# Patient Record
Sex: FEMALE | Race: WHITE | NOT HISPANIC OR LATINO
[De-identification: names, ages, dates, MRNs, and addresses within clinical notes are randomized per-mention and may not be internally consistent; named-entity substitution may affect disease eponyms.]

---

## 2017-05-19 ENCOUNTER — APPOINTMENT (OUTPATIENT)
Dept: GASTROENTEROLOGY | Facility: CLINIC | Age: 62
End: 2017-05-19

## 2017-05-25 ENCOUNTER — RESULT REVIEW (OUTPATIENT)
Age: 62
End: 2017-05-25

## 2017-05-31 ENCOUNTER — RESULT REVIEW (OUTPATIENT)
Age: 62
End: 2017-05-31

## 2017-05-31 ENCOUNTER — APPOINTMENT (OUTPATIENT)
Dept: GASTROENTEROLOGY | Facility: CLINIC | Age: 62
End: 2017-05-31
Payer: COMMERCIAL

## 2017-05-31 PROCEDURE — 43239 EGD BIOPSY SINGLE/MULTIPLE: CPT | Mod: 59

## 2017-05-31 PROCEDURE — 45380 COLONOSCOPY AND BIOPSY: CPT

## 2017-06-06 ENCOUNTER — APPOINTMENT (OUTPATIENT)
Dept: ULTRASOUND IMAGING | Facility: IMAGING CENTER | Age: 62
End: 2017-06-06

## 2017-06-06 ENCOUNTER — OUTPATIENT (OUTPATIENT)
Dept: OUTPATIENT SERVICES | Facility: HOSPITAL | Age: 62
LOS: 1 days | End: 2017-06-06
Payer: COMMERCIAL

## 2017-06-06 DIAGNOSIS — Z00.8 ENCOUNTER FOR OTHER GENERAL EXAMINATION: ICD-10-CM

## 2017-06-06 PROCEDURE — 76700 US EXAM ABDOM COMPLETE: CPT

## 2017-06-08 ENCOUNTER — APPOINTMENT (OUTPATIENT)
Dept: GASTROENTEROLOGY | Facility: CLINIC | Age: 62
End: 2017-06-08

## 2017-06-09 ENCOUNTER — APPOINTMENT (OUTPATIENT)
Dept: OPHTHALMOLOGY | Facility: CLINIC | Age: 62
End: 2017-06-09

## 2017-07-11 ENCOUNTER — OUTPATIENT (OUTPATIENT)
Dept: OUTPATIENT SERVICES | Facility: HOSPITAL | Age: 62
LOS: 1 days | End: 2017-07-11
Payer: COMMERCIAL

## 2017-07-11 ENCOUNTER — TRANSCRIPTION ENCOUNTER (OUTPATIENT)
Age: 62
End: 2017-07-11

## 2017-07-11 VITALS
DIASTOLIC BLOOD PRESSURE: 61 MMHG | OXYGEN SATURATION: 98 % | SYSTOLIC BLOOD PRESSURE: 119 MMHG | RESPIRATION RATE: 14 BRPM | HEART RATE: 68 BPM

## 2017-07-11 VITALS
DIASTOLIC BLOOD PRESSURE: 70 MMHG | OXYGEN SATURATION: 96 % | HEIGHT: 63 IN | SYSTOLIC BLOOD PRESSURE: 119 MMHG | TEMPERATURE: 99 F | WEIGHT: 190.04 LBS | RESPIRATION RATE: 12 BRPM | HEART RATE: 67 BPM

## 2017-07-11 DIAGNOSIS — H35.372 PUCKERING OF MACULA, LEFT EYE: ICD-10-CM

## 2017-07-11 PROCEDURE — 67042 VIT FOR MACULAR HOLE: CPT | Mod: LT

## 2017-07-11 NOTE — ASU PATIENT PROFILE, ADULT - VISION (WITH CORRECTIVE LENSES IF THE PATIENT USUALLY WEARS THEM):
Partially impaired: cannot see medication labels or newsprint, but can see obstacles in path, and the surrounding layout; can count fingers at arm's length/left eye

## 2017-07-11 NOTE — ASU DISCHARGE PLAN (ADULT/PEDIATRIC). - NOTIFY
Persistent Nausea and Vomiting/Pain not relieved by Medications/Swelling that continues/Bleeding that does not stop/Fever greater than 101

## 2017-07-11 NOTE — ASU PATIENT PROFILE, ADULT - PMH
Postmenopausal bleeding    Insomnia    MVP (mitral valve prolapse)    Squamous cell carcinoma    Basal cell carcinoma    Fibrocystic breast disease    Hypothyroid

## 2017-09-08 ENCOUNTER — APPOINTMENT (OUTPATIENT)
Dept: MAMMOGRAPHY | Facility: IMAGING CENTER | Age: 62
End: 2017-09-08
Payer: COMMERCIAL

## 2017-09-08 ENCOUNTER — OUTPATIENT (OUTPATIENT)
Dept: OUTPATIENT SERVICES | Facility: HOSPITAL | Age: 62
LOS: 1 days | End: 2017-09-08
Payer: COMMERCIAL

## 2017-09-08 ENCOUNTER — APPOINTMENT (OUTPATIENT)
Dept: ULTRASOUND IMAGING | Facility: IMAGING CENTER | Age: 62
End: 2017-09-08
Payer: COMMERCIAL

## 2017-09-08 DIAGNOSIS — Z00.00 ENCOUNTER FOR GENERAL ADULT MEDICAL EXAMINATION WITHOUT ABNORMAL FINDINGS: ICD-10-CM

## 2017-09-08 PROCEDURE — 77063 BREAST TOMOSYNTHESIS BI: CPT | Mod: 26

## 2017-09-08 PROCEDURE — 76641 ULTRASOUND BREAST COMPLETE: CPT

## 2017-09-08 PROCEDURE — 77063 BREAST TOMOSYNTHESIS BI: CPT

## 2017-09-08 PROCEDURE — 77067 SCR MAMMO BI INCL CAD: CPT

## 2017-09-08 PROCEDURE — G0202: CPT | Mod: 26

## 2017-09-08 PROCEDURE — 76641 ULTRASOUND BREAST COMPLETE: CPT | Mod: 26,50

## 2017-12-14 ENCOUNTER — APPOINTMENT (OUTPATIENT)
Dept: OPHTHALMOLOGY | Facility: CLINIC | Age: 62
End: 2017-12-14
Payer: COMMERCIAL

## 2017-12-14 PROCEDURE — 92012 INTRM OPH EXAM EST PATIENT: CPT

## 2018-05-31 ENCOUNTER — RESULT REVIEW (OUTPATIENT)
Age: 63
End: 2018-05-31

## 2018-09-21 ENCOUNTER — APPOINTMENT (OUTPATIENT)
Dept: RADIOLOGY | Facility: IMAGING CENTER | Age: 63
End: 2018-09-21
Payer: COMMERCIAL

## 2018-09-21 ENCOUNTER — TRANSCRIPTION ENCOUNTER (OUTPATIENT)
Age: 63
End: 2018-09-21

## 2018-09-21 ENCOUNTER — OUTPATIENT (OUTPATIENT)
Dept: OUTPATIENT SERVICES | Facility: HOSPITAL | Age: 63
LOS: 1 days | End: 2018-09-21
Payer: COMMERCIAL

## 2018-09-21 ENCOUNTER — APPOINTMENT (OUTPATIENT)
Dept: MAMMOGRAPHY | Facility: IMAGING CENTER | Age: 63
End: 2018-09-21
Payer: COMMERCIAL

## 2018-09-21 ENCOUNTER — APPOINTMENT (OUTPATIENT)
Dept: ULTRASOUND IMAGING | Facility: IMAGING CENTER | Age: 63
End: 2018-09-21
Payer: COMMERCIAL

## 2018-09-21 DIAGNOSIS — Z00.8 ENCOUNTER FOR OTHER GENERAL EXAMINATION: ICD-10-CM

## 2018-09-21 PROCEDURE — 77067 SCR MAMMO BI INCL CAD: CPT | Mod: 26

## 2018-09-21 PROCEDURE — 77063 BREAST TOMOSYNTHESIS BI: CPT | Mod: 26

## 2018-09-21 PROCEDURE — 77080 DXA BONE DENSITY AXIAL: CPT | Mod: 26

## 2018-09-21 PROCEDURE — 76641 ULTRASOUND BREAST COMPLETE: CPT

## 2018-09-21 PROCEDURE — 76641 ULTRASOUND BREAST COMPLETE: CPT | Mod: 26,50

## 2018-09-21 PROCEDURE — 77067 SCR MAMMO BI INCL CAD: CPT

## 2018-09-21 PROCEDURE — 77063 BREAST TOMOSYNTHESIS BI: CPT

## 2018-09-21 PROCEDURE — 77080 DXA BONE DENSITY AXIAL: CPT

## 2019-09-01 ENCOUNTER — APPOINTMENT (OUTPATIENT)
Dept: MRI IMAGING | Facility: IMAGING CENTER | Age: 64
End: 2019-09-01
Payer: COMMERCIAL

## 2019-09-01 ENCOUNTER — OUTPATIENT (OUTPATIENT)
Dept: OUTPATIENT SERVICES | Facility: HOSPITAL | Age: 64
LOS: 1 days | End: 2019-09-01
Payer: COMMERCIAL

## 2019-09-01 DIAGNOSIS — Z00.8 ENCOUNTER FOR OTHER GENERAL EXAMINATION: ICD-10-CM

## 2019-09-01 PROCEDURE — 72148 MRI LUMBAR SPINE W/O DYE: CPT

## 2019-09-01 PROCEDURE — 72148 MRI LUMBAR SPINE W/O DYE: CPT | Mod: 26

## 2019-10-17 ENCOUNTER — APPOINTMENT (OUTPATIENT)
Dept: MAMMOGRAPHY | Facility: IMAGING CENTER | Age: 64
End: 2019-10-17
Payer: COMMERCIAL

## 2019-10-17 ENCOUNTER — APPOINTMENT (OUTPATIENT)
Dept: ULTRASOUND IMAGING | Facility: IMAGING CENTER | Age: 64
End: 2019-10-17
Payer: COMMERCIAL

## 2019-10-17 ENCOUNTER — OUTPATIENT (OUTPATIENT)
Dept: OUTPATIENT SERVICES | Facility: HOSPITAL | Age: 64
LOS: 1 days | End: 2019-10-17
Payer: COMMERCIAL

## 2019-10-17 DIAGNOSIS — R92.2 INCONCLUSIVE MAMMOGRAM: ICD-10-CM

## 2019-10-17 DIAGNOSIS — Z12.31 ENCOUNTER FOR SCREENING MAMMOGRAM FOR MALIGNANT NEOPLASM OF BREAST: ICD-10-CM

## 2019-10-17 PROCEDURE — 76641 ULTRASOUND BREAST COMPLETE: CPT

## 2019-10-17 PROCEDURE — 77063 BREAST TOMOSYNTHESIS BI: CPT

## 2019-10-17 PROCEDURE — 76641 ULTRASOUND BREAST COMPLETE: CPT | Mod: 26,50

## 2019-10-17 PROCEDURE — 77067 SCR MAMMO BI INCL CAD: CPT | Mod: 26

## 2019-10-17 PROCEDURE — 77067 SCR MAMMO BI INCL CAD: CPT

## 2019-10-17 PROCEDURE — 77063 BREAST TOMOSYNTHESIS BI: CPT | Mod: 26

## 2020-01-22 ENCOUNTER — TRANSCRIPTION ENCOUNTER (OUTPATIENT)
Age: 65
End: 2020-01-22

## 2020-03-04 ENCOUNTER — APPOINTMENT (OUTPATIENT)
Dept: OTOLARYNGOLOGY | Facility: CLINIC | Age: 65
End: 2020-03-04
Payer: COMMERCIAL

## 2020-03-04 VITALS
WEIGHT: 200 LBS | DIASTOLIC BLOOD PRESSURE: 79 MMHG | HEART RATE: 65 BPM | HEIGHT: 63 IN | SYSTOLIC BLOOD PRESSURE: 144 MMHG | BODY MASS INDEX: 35.44 KG/M2

## 2020-03-04 PROCEDURE — 99204 OFFICE O/P NEW MOD 45 MIN: CPT | Mod: 25

## 2020-03-04 PROCEDURE — 31231 NASAL ENDOSCOPY DX: CPT

## 2020-03-04 NOTE — PHYSICAL EXAM
[Midline] : trachea located in midline position [Normal] : no rashes [de-identified] : hypertrophic inferior turbs

## 2020-03-04 NOTE — PROCEDURE
[FreeTextEntry6] : Afrin and lidocaine were topically sprayed. Flexible scope #2 was used. Right nasal passage with normal hypertrophic inferior, normal middle and superior turbinates. Nasal passage patent with clear middle meatus and sphenoethmoid recess. Left nasal passage with normal hypertrophic inferior, normal middle and superior turbinates. Nasal passage was patent with clear middle meatus and sphenoethmoid recess. No mucopurulence or polyps appreciated. Nasopharynx clear.

## 2020-03-04 NOTE — HISTORY OF PRESENT ILLNESS
[de-identified] : 63 y/o F with many years of nasal congestion at night that alternates sides.  No nasal congestion during the day.  No sinus pain or pressure.  Does have daytime rhinitis, occurs with eating and randomly during the day.  No sinus infections, no snoring.  Has used nasal saline without improvement. Has not used other nasal sprays.

## 2020-03-04 NOTE — CONSULT LETTER
[Dear  ___] : Dear  [unfilled], [Consult Letter:] : I had the pleasure of evaluating your patient, [unfilled]. [Please see my note below.] : Please see my note below. [Consult Closing:] : Thank you very much for allowing me to participate in the care of this patient.  If you have any questions, please do not hesitate to contact me. [Sincerely,] : Sincerely, [FreeTextEntry3] : Manasa Lang MD\par Otolaryngology and Cranial Base Surgery\par Attending Physician - Department of Otolaryngology and Head & Neck Surgery\par [facility]\par  - [employer] [facility]\par Office: [phone]\par Fax: [phone]\par

## 2020-04-10 ENCOUNTER — TRANSCRIPTION ENCOUNTER (OUTPATIENT)
Age: 65
End: 2020-04-10

## 2020-04-25 ENCOUNTER — MESSAGE (OUTPATIENT)
Age: 65
End: 2020-04-25

## 2020-05-07 ENCOUNTER — APPOINTMENT (OUTPATIENT)
Dept: DISASTER EMERGENCY | Facility: CLINIC | Age: 65
End: 2020-05-07

## 2020-05-08 LAB
SARS-COV-2 IGG SERPL IA-ACNC: 6 INDEX
SARS-COV-2 IGG SERPL QL IA: POSITIVE

## 2020-08-19 ENCOUNTER — APPOINTMENT (OUTPATIENT)
Dept: PULMONOLOGY | Facility: CLINIC | Age: 65
End: 2020-08-19
Payer: COMMERCIAL

## 2020-08-19 VITALS
WEIGHT: 200 LBS | HEIGHT: 62.5 IN | BODY MASS INDEX: 35.88 KG/M2 | OXYGEN SATURATION: 93 % | DIASTOLIC BLOOD PRESSURE: 81 MMHG | HEART RATE: 79 BPM | SYSTOLIC BLOOD PRESSURE: 121 MMHG | RESPIRATION RATE: 12 BRPM

## 2020-08-19 DIAGNOSIS — Z80.1 FAMILY HISTORY OF MALIGNANT NEOPLASM OF TRACHEA, BRONCHUS AND LUNG: ICD-10-CM

## 2020-08-19 DIAGNOSIS — Z86.39 PERSONAL HISTORY OF OTHER ENDOCRINE, NUTRITIONAL AND METABOLIC DISEASE: ICD-10-CM

## 2020-08-19 LAB
BASOPHILS # BLD AUTO: 0.02 K/UL
BASOPHILS NFR BLD AUTO: 0.3 %
DEPRECATED D DIMER PPP IA-ACNC: <150 NG/ML DDU
EOSINOPHIL # BLD AUTO: 0.28 K/UL
EOSINOPHIL NFR BLD AUTO: 4.6 %
HCT VFR BLD CALC: 41.5 %
HGB BLD-MCNC: 13 G/DL
IMM GRANULOCYTES NFR BLD AUTO: 0.3 %
LYMPHOCYTES # BLD AUTO: 0.99 K/UL
LYMPHOCYTES NFR BLD AUTO: 16.2 %
MAN DIFF?: NORMAL
MCHC RBC-ENTMCNC: 30 PG
MCHC RBC-ENTMCNC: 31.3 GM/DL
MCV RBC AUTO: 95.6 FL
MONOCYTES # BLD AUTO: 0.49 K/UL
MONOCYTES NFR BLD AUTO: 8 %
NEUTROPHILS # BLD AUTO: 4.33 K/UL
NEUTROPHILS NFR BLD AUTO: 70.6 %
NT-PROBNP SERPL-MCNC: 39 PG/ML
PLATELET # BLD AUTO: 252 K/UL
POCT - HEMOGLOBIN (HGB), QUANTITATIVE, TRANSCUTANEOUS: 13
RBC # BLD: 4.34 M/UL
RBC # FLD: 13.3 %
WBC # FLD AUTO: 6.13 K/UL

## 2020-08-19 PROCEDURE — 94727 GAS DIL/WSHOT DETER LNG VOL: CPT

## 2020-08-19 PROCEDURE — 99203 OFFICE O/P NEW LOW 30 MIN: CPT | Mod: 25

## 2020-08-19 PROCEDURE — 71046 X-RAY EXAM CHEST 2 VIEWS: CPT

## 2020-08-19 PROCEDURE — 94010 BREATHING CAPACITY TEST: CPT

## 2020-08-19 PROCEDURE — 99204 OFFICE O/P NEW MOD 45 MIN: CPT | Mod: 25

## 2020-08-19 PROCEDURE — 94729 DIFFUSING CAPACITY: CPT

## 2020-08-19 PROCEDURE — 88738 HGB QUANT TRANSCUTANEOUS: CPT

## 2020-08-19 PROCEDURE — 36415 COLL VENOUS BLD VENIPUNCTURE: CPT

## 2020-08-19 NOTE — ASSESSMENT
[FreeTextEntry1] : start GERD medicine\par trial of PPI\par high risk for JAIME based on HTN, BMI and M4\par obtain HST\par

## 2020-08-19 NOTE — HISTORY OF PRESENT ILLNESS
[Never] : never [TextBox_4] : LILIANE MILLER is a 65 year old female who presents with cough\par it started before covid 19. nonproductive cough. \par sometimes associated with food/drinks\par denies post nasal drip- had see ENT\par denies GERD- not on any PPI or h2 blocker- was on nexium in past- allergic to it\par cough noteable at night\par no wheezing. no PAYNE. no resp disterss\par \par She is covid antbiody +\par never smoker\par works as RN\par never seen pulm\par no pfts before\par \par \par Sleep:\par goes to bed around 1130pm. takes melatonin 10mg before bed- difficult SOL\par awakes overnight for random reason. long WASO\par final wake up is variable\par when not working she can sleep easily during the day\par drinks some coffee in mornign when working\par no evening caffeine\par no exercise in evening\par no tv in bed\par \par \par ESS7

## 2020-08-19 NOTE — PHYSICAL EXAM
[No Acute Distress] : no acute distress [No Deformities] : no deformities [Low Lying Soft Palate] : low lying soft palate [Enlarged Base of the Tongue] : enlarged base of the tongue [IV] : Mallampati Class: IV [No Murmurs] : no murmurs [Normal S1, S2] : normal s1, s2 [Normal Rate/Rhythm] : normal rate/rhythm [Clear to Auscultation Bilaterally] : clear to auscultation bilaterally [No Resp Distress] : no resp distress [No Focal Deficits] : no focal deficits [Oriented x3] : oriented x3 [Normal Affect] : normal affect

## 2020-08-19 NOTE — PROCEDURE
[FreeTextEntry1] : PA and lateral chest xray performed using standard projections. Bones and soft tissues structures are unremarkable.Cardiac silhouette appears normal. Lung fields are clear. No infiltrate or masses noted. Mediastinal contours are normal.\par IMPRESSION: no acute cardiopulmonary disease\par \par \par veronica within normal limits\par \par \par ESS 7

## 2020-08-20 LAB
ALBUMIN SERPL ELPH-MCNC: 4.5 G/DL
ALP BLD-CCNC: 168 U/L
ALT SERPL-CCNC: 36 U/L
ANION GAP SERPL CALC-SCNC: 15 MMOL/L
AST SERPL-CCNC: 29 U/L
BILIRUB SERPL-MCNC: 0.3 MG/DL
BUN SERPL-MCNC: 16 MG/DL
CALCIUM SERPL-MCNC: 9.7 MG/DL
CHLORIDE SERPL-SCNC: 104 MMOL/L
CO2 SERPL-SCNC: 24 MMOL/L
CREAT SERPL-MCNC: 0.72 MG/DL
CRP SERPL-MCNC: 0.62 MG/DL
FERRITIN SERPL-MCNC: 17 NG/ML
GLUCOSE SERPL-MCNC: 102 MG/DL
POTASSIUM SERPL-SCNC: 4.3 MMOL/L
PROCALCITONIN SERPL-MCNC: 0.06 NG/ML
PROT SERPL-MCNC: 6.7 G/DL
SODIUM SERPL-SCNC: 143 MMOL/L

## 2020-09-02 ENCOUNTER — APPOINTMENT (OUTPATIENT)
Dept: PULMONOLOGY | Facility: CLINIC | Age: 65
End: 2020-09-02
Payer: COMMERCIAL

## 2020-09-02 ENCOUNTER — FORM ENCOUNTER (OUTPATIENT)
Age: 65
End: 2020-09-02

## 2020-09-03 PROCEDURE — 95800 SLP STDY UNATTENDED: CPT

## 2020-09-21 ENCOUNTER — APPOINTMENT (OUTPATIENT)
Dept: PULMONOLOGY | Facility: CLINIC | Age: 65
End: 2020-09-21
Payer: COMMERCIAL

## 2020-09-21 VITALS
DIASTOLIC BLOOD PRESSURE: 88 MMHG | TEMPERATURE: 97.1 F | OXYGEN SATURATION: 98 % | SYSTOLIC BLOOD PRESSURE: 129 MMHG | RESPIRATION RATE: 12 BRPM | HEART RATE: 69 BPM

## 2020-09-21 PROCEDURE — 99213 OFFICE O/P EST LOW 20 MIN: CPT

## 2020-09-21 NOTE — PHYSICAL EXAM
[No Acute Distress] : no acute distress [No Deformities] : no deformities [Low Lying Soft Palate] : low lying soft palate [Enlarged Base of the Tongue] : enlarged base of the tongue [IV] : Mallampati Class: IV [Normal Rate/Rhythm] : normal rate/rhythm [Normal S1, S2] : normal s1, s2 [No Murmurs] : no murmurs [No Resp Distress] : no resp distress [Clear to Auscultation Bilaterally] : clear to auscultation bilaterally [No Focal Deficits] : no focal deficits [Oriented x3] : oriented x3 [Normal Affect] : normal affect

## 2020-09-21 NOTE — DISCUSSION/SUMMARY
[FreeTextEntry1] : severe JAIME\par start autocpap\par f/u on feno - cough variant asthma?\par contiue PPI\par denies post nasal drip\par \par \par Narcotic pain medication increases the severity of untreated JAIME and contributes to central sleep apnea. Sleeping pills may increase the severity of untreated JAIME and their use should be reevaluated once the JAIME is treated.  The risk of perioperative anesthesia complications may be increased in the presence of JAIME.\par \par \par

## 2020-09-21 NOTE — PROCEDURE
[FreeTextEntry1] : severe JAIME on 1 night test\par \par getting flu with her job\par \par cough- unable to do feno\par pfts done recently within normal flow and volumes\par \par \par RVP today

## 2020-09-21 NOTE — HISTORY OF PRESENT ILLNESS
[TextBox_4] : LILIANE MILLER is a 65 year old female who presents to f/u on hST\par \par 1) fatigue\par 1 night HST\par AHI 41/ hr\par min saturation 66%\par \par \par 2) cough\par etiology?\par occasional productive\par mostly at night\par no wheezing, no dyspnea

## 2020-09-22 LAB — RAPID RVP RESULT: NOT DETECTED

## 2020-10-13 ENCOUNTER — OUTPATIENT (OUTPATIENT)
Dept: OUTPATIENT SERVICES | Facility: HOSPITAL | Age: 65
LOS: 1 days | End: 2020-10-13
Payer: COMMERCIAL

## 2020-10-13 ENCOUNTER — RESULT REVIEW (OUTPATIENT)
Age: 65
End: 2020-10-13

## 2020-10-13 ENCOUNTER — APPOINTMENT (OUTPATIENT)
Dept: MRI IMAGING | Facility: IMAGING CENTER | Age: 65
End: 2020-10-13
Payer: COMMERCIAL

## 2020-10-13 DIAGNOSIS — Z00.8 ENCOUNTER FOR OTHER GENERAL EXAMINATION: ICD-10-CM

## 2020-10-13 PROCEDURE — 73721 MRI JNT OF LWR EXTRE W/O DYE: CPT | Mod: 26,LT

## 2020-10-13 PROCEDURE — 73721 MRI JNT OF LWR EXTRE W/O DYE: CPT

## 2020-11-06 ENCOUNTER — NON-APPOINTMENT (OUTPATIENT)
Age: 65
End: 2020-11-06

## 2020-11-09 ENCOUNTER — APPOINTMENT (OUTPATIENT)
Dept: PULMONOLOGY | Facility: CLINIC | Age: 65
End: 2020-11-09
Payer: COMMERCIAL

## 2020-11-09 VITALS
DIASTOLIC BLOOD PRESSURE: 74 MMHG | SYSTOLIC BLOOD PRESSURE: 112 MMHG | HEART RATE: 72 BPM | OXYGEN SATURATION: 95 % | RESPIRATION RATE: 14 BRPM | TEMPERATURE: 97.4 F

## 2020-11-09 PROCEDURE — 99072 ADDL SUPL MATRL&STAF TM PHE: CPT

## 2020-11-09 PROCEDURE — 99213 OFFICE O/P EST LOW 20 MIN: CPT

## 2020-11-09 NOTE — HISTORY OF PRESENT ILLNESS
[TextBox_4] : LILIANE MILLER is a 65 year old female who presents to f/u on JAIME/CPAP\par \par 1) JAIME\par AHI 41/hr\par started autocpap\par still with cough- occurs mostly at night. dry in nature\par does not think she sleeps with mouth open\par \par 2) cough\par mostly at night\par she has been on claritin and nasal spray\par uses flonase qhs- no help\par off PPI\par \par has broken her foot- on ibuprofen or pain control\par

## 2020-11-09 NOTE — ASSESSMENT
[FreeTextEntry1] : 1) JAIME\par Narcotic pain medication increases the severity of untreated JAIME and contributes to central sleep apnea. Sleeping pills may increase the severity of untreated JAIME and their use should be reevaluated once the JAIME is treated.  The risk of perioperative anesthesia complications may be increased in the presence of JAIME.\par \par \par she is using autocpap and benefitting fro use\par will meet with RT : CPAP clinic\par get chin strap\par increase max pressure to [id number]\par discussed humidification changes\par \par 2) cough- if the above does not help\par ent f/u? turbinates?\par she is on ibuprofen and may need to restart PPI\par pfts- done 8/2020- normal\par unable to do feno\par xray was normal as well\par if no resolution- ct chest noncontrast?\par \par f/u with me in 1 month\par

## 2020-11-09 NOTE — PROCEDURE
[FreeTextEntry1] : compliance data reviewed :\par \par 10/2020-11/5/2020\par % days used :  100 %\par avg use > 4 hours: 91%\par avg use: 7 hrs & 40  min\par avg pressure 13 cmh20\par avg AHI 8\par moderate leak.\par

## 2020-11-10 ENCOUNTER — APPOINTMENT (OUTPATIENT)
Dept: PULMONOLOGY | Facility: CLINIC | Age: 65
End: 2020-11-10
Payer: COMMERCIAL

## 2020-11-10 VITALS
DIASTOLIC BLOOD PRESSURE: 80 MMHG | BODY MASS INDEX: 35.88 KG/M2 | OXYGEN SATURATION: 94 % | WEIGHT: 200 LBS | TEMPERATURE: 98 F | SYSTOLIC BLOOD PRESSURE: 150 MMHG | RESPIRATION RATE: 16 BRPM | HEIGHT: 62.5 IN | HEART RATE: 86 BPM

## 2020-11-10 PROCEDURE — 94660 CPAP INITIATION&MGMT: CPT

## 2020-11-10 PROCEDURE — 99072 ADDL SUPL MATRL&STAF TM PHE: CPT

## 2020-11-11 NOTE — PROCEDURE
[FreeTextEntry1] : Pt seen at drs office. Vitals were taken and are stable. Pt stated that she needed a new mask. Currently was using a nasal pillow mask.  Pt was switched to a FFm a [employer] ffm sample was given. However if leaking should occur. I suggested she get a small headgear instead of the standard one that comes with the mask. Or to switch to something even more secure, an airfit f20 for her starter pk. She may need a xs or sml in this brand. Pt advised to call us  and let us know if we need to send a new rx to her DME company.

## 2020-11-23 ENCOUNTER — APPOINTMENT (OUTPATIENT)
Dept: OTOLARYNGOLOGY | Facility: CLINIC | Age: 65
End: 2020-11-23
Payer: COMMERCIAL

## 2020-11-23 VITALS
WEIGHT: 210 LBS | BODY MASS INDEX: 37.68 KG/M2 | DIASTOLIC BLOOD PRESSURE: 80 MMHG | HEIGHT: 62.5 IN | SYSTOLIC BLOOD PRESSURE: 140 MMHG | TEMPERATURE: 96.8 F

## 2020-11-23 PROCEDURE — 31575 DIAGNOSTIC LARYNGOSCOPY: CPT

## 2020-11-23 PROCEDURE — 99214 OFFICE O/P EST MOD 30 MIN: CPT | Mod: 25

## 2020-11-23 NOTE — ASSESSMENT
[FreeTextEntry1] : Cough\par - LPRD on exam.  Discussed often need to treat 4-6 weeks to allow decreased laryngeal inflammation\par - likely heightened by having had covid and hypersensitivity of larynx after URI can last for months\par - Lifestyle modification discussed - Reflux handout given and reviewed. \par \par Nasal congestion\par - Continue Flonase \par - Sinus wash\par \par Vasomotor Rhinitis\par - Ipratropium PRN\par \par HTN\par - F/U with PMD\par \par F/U PRN if symptoms not improved.

## 2020-11-23 NOTE — HISTORY OF PRESENT ILLNESS
[de-identified] : 66 y/o F with chronic cough for 1 year.  Pos throat clearing.  no heart burn.  No SOB.  No trouble eating or drinking.  Feels one a week to once a month may get "choking" sensation with liquids. \bijan Continues to use Flonase and feels nasal congestion has much improved.   \bijan Had taken Pantoprazole 40 mg x 3 weeks without improvement. Had COVID in April and recovered although her  passed from complications.\bijan

## 2020-11-23 NOTE — PROCEDURE
[FreeTextEntry6] : Flexible scope #G8 was used. Right nasal passage with normal inferior, middle and superior turbinates. Nasal passage patent with clear middle meatus and sphenoethmoid recess. Left nasal passage with normal inferior, middle and superior turbinates. Nasal passage was patent with clear middle meatus and sphenoethmoid recess. No mucopurulence or polyps appreciated. Nasopharynx clear.\par \par  [de-identified] : Flexible scope #G8 used. Passed through nasal passage and nasopharynx/oropharynx/hypopharynx clear. Supraglottis normal. Glottis with fully mobile vocal cords without lesions or masses. Visualized subglottis clear. Postcricoid area with erythema or edema. No pooling of secretions.\par \par

## 2020-11-25 ENCOUNTER — APPOINTMENT (OUTPATIENT)
Dept: PULMONOLOGY | Facility: CLINIC | Age: 65
End: 2020-11-25
Payer: COMMERCIAL

## 2020-11-25 PROCEDURE — 99213 OFFICE O/P EST LOW 20 MIN: CPT | Mod: 95

## 2020-11-25 NOTE — ASSESSMENT
[FreeTextEntry1] : needs FFM\par needs chin strap\par Narcotic pain medication increases the severity of untreated JAIME and contributes to central sleep apnea. Sleeping pills may increase the severity of untreated JAIME and their use should be reevaluated once the JAIME is treated.  The risk of perioperative anesthesia complications may be increased in the presence of JAIME.\par \par \par mai try to send her chin strap from office\par send rx again to [facility] [place] re: chin strap\par \par f.u telehealth 1 week

## 2020-11-25 NOTE — PROCEDURE
[FreeTextEntry1] : compliance data reviewed :\par \par 10/2020-11/18/2020\par % days used :  97 %\par avg use > 4 hours: 90%\par avg use: 7 hrs &34  min\par avg pressure 12 cmh20\par avg AHI 13\par moderate leak.\par

## 2020-11-25 NOTE — HISTORY OF PRESENT ILLNESS
[Never] : never [TextBox_4] : This visit was provided via telehealth using real-time 2-way audio visual technology. The patient,  LILIANE MILLER , was located at home, [address] [place] [address]\[place] [address] at the time of the visit. \par The provider, Doc Hsu, was located at office [address] [place] [address] at the time of the visit. \par The patient, Ms. LILIANE MILLER and Physician MD Hsu, Doc participated in the telehealth encounter. \par Verbal consent obtained by  from patient \par \par \par LILIANE MILLER is a 65 year old female who presents to f/u on CPAP\par she did see our RT- for CPAP I&M-  did not like the small mask- felt it was moving too much; too much pressure in the face.\par so she is back on the nasal pillows, still with dry mouth.\par humidification at 5. not helping\par not well rested- wakes up with dry mouth\par still with cough- saw ENT- back on PPI\par GI work up? she said she is pending EGD in the future\par \par \par \par DME: [email or web address]

## 2020-12-07 ENCOUNTER — APPOINTMENT (OUTPATIENT)
Dept: PULMONOLOGY | Facility: CLINIC | Age: 65
End: 2020-12-07
Payer: COMMERCIAL

## 2020-12-07 VITALS
TEMPERATURE: 97.6 F | SYSTOLIC BLOOD PRESSURE: 137 MMHG | OXYGEN SATURATION: 96 % | HEART RATE: 78 BPM | RESPIRATION RATE: 15 BRPM | DIASTOLIC BLOOD PRESSURE: 71 MMHG

## 2020-12-07 PROCEDURE — 99213 OFFICE O/P EST LOW 20 MIN: CPT

## 2020-12-07 PROCEDURE — 99072 ADDL SUPL MATRL&STAF TM PHE: CPT

## 2020-12-07 NOTE — HISTORY OF PRESENT ILLNESS
[Never] : never [TextBox_4] : LILIANE MILLER is a 65 year old female who presents to f/u on Anders\par did not like the nasal pillows\par back on her original mask- still with leak and dry mouth and poor quality sleep\par cough is better- she is on PPI- pending GI f/u in 2/2021\par \par

## 2020-12-07 NOTE — PROCEDURE
[FreeTextEntry1] : compliance data reviewed :\par \par 11/7/2020-12/6/2020\par % days used :  97 %\par avg use > 4 hours: 90 %\par avg use: 7 hrs  & 19  min\par avg pressure 13 cmh20\par avg AHI 11\par moderate leak.\par

## 2020-12-07 NOTE — DISCUSSION/SUMMARY
[FreeTextEntry1] : despite leak being better\par there is still high AHI\par think she needs titration study\par \par start at cpap and then transition to BPAP\par

## 2020-12-24 ENCOUNTER — APPOINTMENT (OUTPATIENT)
Dept: OPHTHALMOLOGY | Facility: CLINIC | Age: 65
End: 2020-12-24
Payer: COMMERCIAL

## 2020-12-24 ENCOUNTER — NON-APPOINTMENT (OUTPATIENT)
Age: 65
End: 2020-12-24

## 2020-12-24 PROCEDURE — 92004 COMPRE OPH EXAM NEW PT 1/>: CPT

## 2020-12-24 PROCEDURE — 99072 ADDL SUPL MATRL&STAF TM PHE: CPT

## 2020-12-28 ENCOUNTER — RESULT REVIEW (OUTPATIENT)
Age: 65
End: 2020-12-28

## 2020-12-28 ENCOUNTER — APPOINTMENT (OUTPATIENT)
Dept: MAMMOGRAPHY | Facility: IMAGING CENTER | Age: 65
End: 2020-12-28
Payer: COMMERCIAL

## 2020-12-28 ENCOUNTER — APPOINTMENT (OUTPATIENT)
Dept: RADIOLOGY | Facility: IMAGING CENTER | Age: 65
End: 2020-12-28
Payer: COMMERCIAL

## 2020-12-28 ENCOUNTER — OUTPATIENT (OUTPATIENT)
Dept: OUTPATIENT SERVICES | Facility: HOSPITAL | Age: 65
LOS: 1 days | End: 2020-12-28
Payer: COMMERCIAL

## 2020-12-28 DIAGNOSIS — Z00.8 ENCOUNTER FOR OTHER GENERAL EXAMINATION: ICD-10-CM

## 2020-12-28 PROCEDURE — 77067 SCR MAMMO BI INCL CAD: CPT | Mod: 26

## 2020-12-28 PROCEDURE — 77067 SCR MAMMO BI INCL CAD: CPT

## 2020-12-28 PROCEDURE — 77063 BREAST TOMOSYNTHESIS BI: CPT | Mod: 26

## 2020-12-28 PROCEDURE — 77080 DXA BONE DENSITY AXIAL: CPT | Mod: 26

## 2020-12-28 PROCEDURE — 77063 BREAST TOMOSYNTHESIS BI: CPT

## 2020-12-28 PROCEDURE — 77080 DXA BONE DENSITY AXIAL: CPT

## 2021-01-20 ENCOUNTER — TRANSCRIPTION ENCOUNTER (OUTPATIENT)
Age: 66
End: 2021-01-20

## 2021-01-21 ENCOUNTER — APPOINTMENT (OUTPATIENT)
Dept: PULMONOLOGY | Facility: CLINIC | Age: 66
End: 2021-01-21
Payer: COMMERCIAL

## 2021-01-21 PROCEDURE — 99213 OFFICE O/P EST LOW 20 MIN: CPT | Mod: 95

## 2021-01-21 NOTE — HISTORY OF PRESENT ILLNESS
[Never] : never [TextBox_4] : This visit was provided via telehealth using real-time 2-way audio visual technology. The patient,  LILIANE MILLER , was located at home, [address] [place] [address]\[place] [address] at the time of the visit. \par The provider, Doc Hsu, was located at office [address] [place] [address] at the time of the visit. \par The patient, Ms. LILIANE MILLER and Physician Doc Centeno participated in the telehealth encounter. \par Verbal consent obtained by  from patient \par \par \par LILIANE MILLER is a 65 year old female who presents to fu on JAIME\par she now has a chin strap- using nasal pillows\par some irritation with the nasal pillows- using vaseline\par still fatigue\par sleeping better through the night- no dry mouth\par

## 2021-01-21 NOTE — PROCEDURE
[FreeTextEntry1] : compliance data reviewed :\par \par 12/21/2020-1/19/2021\par avg use > 4 hours:  100%\par avg use: 7 hrs & 37  min\par avg pressure 14 cmh20\par avg AHI 4\par minimal leak.\par

## 2021-01-21 NOTE — PHYSICAL EXAM
[No Acute Distress] : no acute distress [Well Nourished] : well nourished [Well Developed] : well developed [No Resp Distress] : no resp distress [No Acc Muscle Use] : no acc muscle use [Oriented x3] : oriented x3

## 2021-01-21 NOTE — ASSESSMENT
[FreeTextEntry1] : continue autocpap with chin strap\par will change the max pressure to [id number]\par will reassess fatigue after 2-3 months of daily cpap use\par may need tos witch from nasal pillows to dreamwear or nasal mask if nare irritation persists\par \par she is adherent to cpap and should continue use\par \par Narcotic pain medication increases the severity of untreated JAIME and contributes to central sleep apnea. Sleeping pills may increase the severity of untreated JAIME and their use should be reevaluated once the JAIME is treated.  The risk of perioperative anesthesia complications may be increased in the presence of JAIME.\par \par \par

## 2021-02-23 ENCOUNTER — TRANSCRIPTION ENCOUNTER (OUTPATIENT)
Age: 66
End: 2021-02-23

## 2021-02-25 ENCOUNTER — APPOINTMENT (OUTPATIENT)
Dept: GASTROENTEROLOGY | Facility: CLINIC | Age: 66
End: 2021-02-25
Payer: COMMERCIAL

## 2021-02-25 ENCOUNTER — LABORATORY RESULT (OUTPATIENT)
Age: 66
End: 2021-02-25

## 2021-02-25 VITALS
DIASTOLIC BLOOD PRESSURE: 81 MMHG | HEIGHT: 62.5 IN | SYSTOLIC BLOOD PRESSURE: 128 MMHG | HEART RATE: 73 BPM | TEMPERATURE: 97.3 F | WEIGHT: 221 LBS | BODY MASS INDEX: 39.65 KG/M2

## 2021-02-25 DIAGNOSIS — Z63.4 DISAPPEARANCE AND DEATH OF FAMILY MEMBER: ICD-10-CM

## 2021-02-25 PROCEDURE — 99072 ADDL SUPL MATRL&STAF TM PHE: CPT

## 2021-02-25 PROCEDURE — 99204 OFFICE O/P NEW MOD 45 MIN: CPT | Mod: 25

## 2021-02-25 PROCEDURE — 82274 ASSAY TEST FOR BLOOD FECAL: CPT | Mod: QW

## 2021-02-25 PROCEDURE — 36415 COLL VENOUS BLD VENIPUNCTURE: CPT

## 2021-02-25 RX ORDER — MULTIVITAMIN,THER AND MINERALS
TABLET ORAL
Refills: 0 | Status: DISCONTINUED | COMMUNITY
End: 2021-02-25

## 2021-02-25 RX ORDER — PANTOPRAZOLE 40 MG/1
40 TABLET, DELAYED RELEASE ORAL DAILY
Qty: 30 | Refills: 0 | Status: DISCONTINUED | COMMUNITY
Start: 2020-08-19 | End: 2021-02-25

## 2021-02-25 RX ORDER — MELOXICAM 15 MG/1
TABLET ORAL
Refills: 0 | Status: DISCONTINUED | COMMUNITY
End: 2021-02-25

## 2021-02-25 RX ORDER — PANTOPRAZOLE 40 MG/1
40 TABLET, DELAYED RELEASE ORAL DAILY
Qty: 30 | Refills: 3 | Status: DISCONTINUED | COMMUNITY
Start: 2020-11-23 | End: 2021-02-25

## 2021-02-25 RX ORDER — GLUCOSAMINE/MSM/CHONDROIT SULF 500-166.6
1000 TABLET ORAL
Refills: 0 | Status: DISCONTINUED | COMMUNITY
End: 2021-02-25

## 2021-02-25 SDOH — SOCIAL STABILITY - SOCIAL INSECURITY: DISSAPEARANCE AND DEATH OF FAMILY MEMBER: Z63.4

## 2021-02-25 NOTE — CONSULT LETTER
[Dear  ___] : Dear  [unfilled], [Consult Letter:] : I had the pleasure of evaluating your patient, [unfilled]. [Please see my note below.] : Please see my note below. [Consult Closing:] : Thank you very much for allowing me to participate in the care of this patient.  If you have any questions, please do not hesitate to contact me. [Sincerely,] : Sincerely, [DrStorm  ___] : Dr. PADILLA [FreeTextEntry3] : Kip Snell M.D.\par

## 2021-02-25 NOTE — PHYSICAL EXAM
[General Appearance - Alert] : alert [General Appearance - In No Acute Distress] : in no acute distress [General Appearance - Well Nourished] : well nourished [General Appearance - Well Developed] : well developed [Sclera] : the sclera and conjunctiva were normal [Neck Appearance] : the appearance of the neck was normal [Neck Cervical Mass (___cm)] : no neck mass was observed [Jugular Venous Distention Increased] : there was no jugular-venous distention [Thyroid Diffuse Enlargement] : the thyroid was not enlarged [Thyroid Nodule] : there were no palpable thyroid nodules [Auscultation Breath Sounds / Voice Sounds] : lungs were clear to auscultation bilaterally [Heart Rate And Rhythm] : heart rate was normal and rhythm regular [Heart Sounds] : normal S1 and S2 [Heart Sounds Gallop] : no gallops [Murmurs] : no murmurs [Heart Sounds Pericardial Friction Rub] : no pericardial rub [Full Pulse] : the pedal pulses are present [Edema] : there was no peripheral edema [Bowel Sounds] : normal bowel sounds [Abdomen Soft] : soft [Abdomen Tenderness] : non-tender [Abdomen Mass (___ Cm)] : no abdominal mass palpated [Normal Sphincter Tone] : normal sphincter tone [No Rectal Mass] : no rectal mass [Internal Hemorrhoid] : internal hemorrhoids [External Hemorrhoid] : external hemorrhoids [Cervical Lymph Nodes Enlarged Posterior Bilaterally] : posterior cervical [Cervical Lymph Nodes Enlarged Anterior Bilaterally] : anterior cervical [Supraclavicular Lymph Nodes Enlarged Bilaterally] : supraclavicular [Inguinal Lymph Nodes Enlarged Bilaterally] : inguinal [Abnormal Walk] : normal gait [Nail Clubbing] : no clubbing  or cyanosis of the fingernails [Musculoskeletal - Swelling] : no joint swelling seen [Skin Color & Pigmentation] : normal skin color and pigmentation [Skin Turgor] : normal skin turgor [] : no rash [Oriented To Time, Place, And Person] : oriented to person, place, and time [Impaired Insight] : insight and judgment were intact [Affect] : the affect was normal [Occult Blood Positive] : stool was negative for occult blood [FreeTextEntry1] : FIT negative

## 2021-02-25 NOTE — ASSESSMENT
[FreeTextEntry1] : 1.  Chronic GERD with LPR; essentially negative EGD May 2017--she has multiple risk factors for reflux, and is aware that a negative EGD does not rule out GERD.\par 2.  Essentially negative colonoscopy May 2017; family history of colonic polyps (sister).  Stool guaiac negative with negative fecal immunochemical test on today's exam.\par 3.  Elevated alkaline phosphatase, GGT and ALT--statistically likely to have fatty liver.  Must rule out hepatic metastases, stones.  PBC, sarcoid would be other possibilities.\par 4.  Chronic cough may be related to undertreated reflux, etc.\par 5.  Obesity.\par 6.  Sleep apnea.\par 7.  Hypothyroidism.\par 8.  Hypertension.\par 9.  History of low back pain.\par 10.  Status post skin cancer excisions.\par 11.  Status post hysteroscopy with uterine polypectomy, T&A, bilateral iridectomies, LASIK.\par \par Plan:\par 1.  Medical records reviewed.\par 2.  Extensive bloodwork drawn by me this afternoon.\par 3.  Abdominal ultrasound.\par 4.  Can rechallenge with pantoprazole in the evenings, with Pepcid in the mornings to see if chronic cough, GERD improve.\par 5.  Might consider EGD, earlier repeat colonoscopy, depending on clinical course.\par 6.  Possible CT scan, liver biopsy, pending results of above.

## 2021-02-25 NOTE — HISTORY OF PRESENT ILLNESS
[FreeTextEntry1] : Karishma has been experiencing heartburn both during the day and frequently at night, requiring her to sleep in an upward position.  She has been taking Mylanta at bedtime, with incomplete relief.  She was taking pantoprazole for a couple of months, but stopped because of long-term side effect concerns.  She has had a chronic cough for many months, had COVID-19 in early spring (and her  passed from same).  From time to time, the cough becomes much worse, with associated choking, sometimes producing some spit or mucus.  She was seen by ENT, who advised her of LPR and suggested consideration of repeat EGD.  Last EGD 2017 was essentially negative.  Last colonoscopy May 2017  was negative for ileocolitis.  She has been averaging 2-3 BMs per day, often thinner, with some associated urgency, without gross blood.  Recent labs revealed increasing ALP (246), , with ALT 57.  Since last visit, she was diagnosed with sleep apnea.

## 2021-02-25 NOTE — REVIEW OF SYSTEMS
[Recent Weight Gain (___ Lbs)] : recent [unfilled] ~Ulb weight gain [Nasal Discharge] : nasal discharge [Shortness Of Breath] : shortness of breath [Cough] : cough [SOB on Exertion] : shortness of breath during exertion [Joint Pain] : joint pain [Joint Stiffness] : joint stiffness [Sleep Disturbances] : sleep disturbances [Negative] : Heme/Lymph [As Noted in HPI] : as noted in HPI [Heartburn] : heartburn

## 2021-02-26 LAB
AFP-TM SERPL-MCNC: 3.4 NG/ML
ALBUMIN SERPL ELPH-MCNC: 4.4 G/DL
ALP BLD-CCNC: 212 U/L
ALT SERPL-CCNC: 21 U/L
ANION GAP SERPL CALC-SCNC: 10 MMOL/L
APTT BLD: 34.4 SEC
AST SERPL-CCNC: 23 U/L
BASOPHILS # BLD AUTO: 0.03 K/UL
BASOPHILS NFR BLD AUTO: 0.4 %
BILIRUB DIRECT SERPL-MCNC: 0.1 MG/DL
BILIRUB SERPL-MCNC: 0.2 MG/DL
BUN SERPL-MCNC: 17 MG/DL
CALCIUM SERPL-MCNC: 9.8 MG/DL
CHLORIDE SERPL-SCNC: 103 MMOL/L
CHOLEST SERPL-MCNC: 190 MG/DL
CO2 SERPL-SCNC: 28 MMOL/L
CREAT SERPL-MCNC: 0.9 MG/DL
EOSINOPHIL # BLD AUTO: 0.28 K/UL
EOSINOPHIL NFR BLD AUTO: 3.6 %
ESTIMATED AVERAGE GLUCOSE: 117 MG/DL
FERRITIN SERPL-MCNC: 12 NG/ML
GGT SERPL-CCNC: 90 U/L
GLUCOSE SERPL-MCNC: 104 MG/DL
HBA1C MFR BLD HPLC: 5.7 %
HCT VFR BLD CALC: 39.7 %
HDLC SERPL-MCNC: 101 MG/DL
HGB BLD-MCNC: 12.3 G/DL
IMM GRANULOCYTES NFR BLD AUTO: 0.3 %
INR PPP: 1.01 RATIO
IRON SATN MFR SERPL: 11 %
IRON SERPL-MCNC: 42 UG/DL
LDLC SERPL CALC-MCNC: 66 MG/DL
LYMPHOCYTES # BLD AUTO: 1.19 K/UL
LYMPHOCYTES NFR BLD AUTO: 15.1 %
MAGNESIUM SERPL-MCNC: 2.2 MG/DL
MAN DIFF?: NORMAL
MCHC RBC-ENTMCNC: 29.8 PG
MCHC RBC-ENTMCNC: 31 GM/DL
MCV RBC AUTO: 96.1 FL
MONOCYTES # BLD AUTO: 0.61 K/UL
MONOCYTES NFR BLD AUTO: 7.8 %
NEUTROPHILS # BLD AUTO: 5.74 K/UL
NEUTROPHILS NFR BLD AUTO: 72.8 %
NONHDLC SERPL-MCNC: 89 MG/DL
PHOSPHATE SERPL-MCNC: 3.4 MG/DL
PLATELET # BLD AUTO: 304 K/UL
POTASSIUM SERPL-SCNC: 4.7 MMOL/L
PROT SERPL-MCNC: 7.3 G/DL
PT BLD: 12 SEC
RBC # BLD: 4.13 M/UL
RBC # FLD: 13.3 %
SODIUM SERPL-SCNC: 141 MMOL/L
T3 SERPL-MCNC: 93 NG/DL
T3RU NFR SERPL: 1.1 TBI
T4 FREE SERPL-MCNC: 1.2 NG/DL
T4 SERPL-MCNC: 7.8 UG/DL
TIBC SERPL-MCNC: 397 UG/DL
TRIGL SERPL-MCNC: 113 MG/DL
TSH SERPL-ACNC: 1.34 UIU/ML
UIBC SERPL-MCNC: 355 UG/DL
WBC # FLD AUTO: 7.87 K/UL

## 2021-02-28 ENCOUNTER — NON-APPOINTMENT (OUTPATIENT)
Age: 66
End: 2021-02-28

## 2021-03-01 LAB
ACE BLD-CCNC: 37 U/L
ANA PAT FLD IF-IMP: ABNORMAL
ANA SER IF-ACNC: ABNORMAL
ENDOMYSIUM IGA SER QL: NEGATIVE
ENDOMYSIUM IGA TITR SER: NORMAL
GLIADIN IGA SER QL: <5 UNITS
GLIADIN IGG SER QL: <5 UNITS
GLIADIN PEPTIDE IGA SER-ACNC: NEGATIVE
GLIADIN PEPTIDE IGG SER-ACNC: NEGATIVE
IGA SER QL IEP: 235 MG/DL
TTG IGA SER IA-ACNC: <1.2 U/ML
TTG IGA SER-ACNC: NEGATIVE
TTG IGG SER IA-ACNC: 3.4 U/ML
TTG IGG SER IA-ACNC: NEGATIVE

## 2021-03-02 LAB
MITOCHONDRIA AB SER IF-ACNC: NORMAL
SMOOTH MUSCLE AB SER QL IF: NORMAL

## 2021-03-11 ENCOUNTER — RESULT REVIEW (OUTPATIENT)
Age: 66
End: 2021-03-11

## 2021-03-11 ENCOUNTER — APPOINTMENT (OUTPATIENT)
Dept: ULTRASOUND IMAGING | Facility: IMAGING CENTER | Age: 66
End: 2021-03-11
Payer: COMMERCIAL

## 2021-03-11 ENCOUNTER — OUTPATIENT (OUTPATIENT)
Dept: OUTPATIENT SERVICES | Facility: HOSPITAL | Age: 66
LOS: 1 days | End: 2021-03-11
Payer: COMMERCIAL

## 2021-03-11 DIAGNOSIS — R09.81 NASAL CONGESTION: ICD-10-CM

## 2021-03-11 PROCEDURE — 76700 US EXAM ABDOM COMPLETE: CPT | Mod: 26

## 2021-03-11 PROCEDURE — 76700 US EXAM ABDOM COMPLETE: CPT

## 2021-03-25 ENCOUNTER — NON-APPOINTMENT (OUTPATIENT)
Age: 66
End: 2021-03-25

## 2021-03-25 ENCOUNTER — TRANSCRIPTION ENCOUNTER (OUTPATIENT)
Age: 66
End: 2021-03-25

## 2021-03-26 ENCOUNTER — TRANSCRIPTION ENCOUNTER (OUTPATIENT)
Age: 66
End: 2021-03-26

## 2021-05-18 ENCOUNTER — TRANSCRIPTION ENCOUNTER (OUTPATIENT)
Age: 66
End: 2021-05-18

## 2021-06-04 ENCOUNTER — APPOINTMENT (OUTPATIENT)
Dept: GASTROENTEROLOGY | Facility: CLINIC | Age: 66
End: 2021-06-04
Payer: MEDICARE

## 2021-06-04 VITALS
DIASTOLIC BLOOD PRESSURE: 82 MMHG | WEIGHT: 222 LBS | HEIGHT: 62.5 IN | BODY MASS INDEX: 39.83 KG/M2 | HEART RATE: 74 BPM | SYSTOLIC BLOOD PRESSURE: 128 MMHG

## 2021-06-04 PROCEDURE — 99214 OFFICE O/P EST MOD 30 MIN: CPT | Mod: 25

## 2021-06-04 NOTE — PHYSICAL EXAM
[General Appearance - Alert] : alert [General Appearance - In No Acute Distress] : in no acute distress [General Appearance - Well Nourished] : well nourished [General Appearance - Well Developed] : well developed [FreeTextEntry1] : obese [Oriented To Time, Place, And Person] : oriented to person, place, and time [Impaired Insight] : insight and judgment were intact [Affect] : the affect was normal

## 2021-06-04 NOTE — HISTORY OF PRESENT ILLNESS
[FreeTextEntry1] : Last labs revealed , GGT 90, with normal aminotransferases; MENA 1:80 (homogeneous) with negative smooth muscle and mitochondrial antibodies; and ferritin 12. She mentioned that she donates blood every 3 months.  She continues to experience some heartburn and frequent burping despite taking Pepcid 20 mg AM and pantoprazole 40 mg at bedtime.  Chronic nonproductive cough can be quiescent for 1-2 weeks, only to relapse, bhavesh. on recumbency, but sometimes occurs while sitting up during the day.  Bowels have changed somewhat, previously 2-3 BMs daily, now 1 BM daily.  Panendoscopy May 2017 revealed only hemorrhoids.  Abdominal ultrasound 3/11/2021 was normal.

## 2021-06-04 NOTE — ASSESSMENT
[FreeTextEntry1] : 1.  Chronic GERD with LPR; essentially negative EGD May 2017--she has multiple risk factors for reflux, and is aware that a negative EGD does not rule out GERD.\par 2.  Essentially negative colonoscopy May 2017; family history of colonic polyps (sister).  \par 3.  Elevated alkaline phosphatase, GGT of unclear cause--statistically likely to have fatty liver, despite negative ultrasound.  Elevated MENA with normal smooth muscle and mitochondrial antibodies would be of dubious significance (doubt autoimmune hepatitis). ?drug-induced liver injury. \par 4.  Low ferritin may be related to frequent blood donations, but must rule out concomitant GI source.  \par 5.  Chronic cough may be related to undertreated reflux, etc.\par 6.  Obesity.\par 7.  Sleep apnea.\par 8.  Hypothyroidism.\par 9.  Hypertension.\par 10.  History of low back pain.\par 11.  Status post skin cancer excisions.\par 12.  Status post hysteroscopy with uterine polypectomy, T&A, bilateral iridectomies, LASIK.\par 13.  Allergic to Nexium?\par \par Plan:\par 1.  Medical records reviewed.\par 2.  Bloodwork drawn by me this afternoon.\par 3.  CT enterography. \par 4.  Might consider EGD, earlier repeat colonoscopy, and even capsule endoscopy, depending on clinical course.\par 5.  May refer to Hepatology depending on results of above.  May ultimately require liver biopsy. \par 6.  Hold off on future blood donations!

## 2021-06-04 NOTE — CONSULT LETTER
[Dear  ___] : Dear  [unfilled], [Courtesy Letter:] : I had the pleasure of seeing your patient, [unfilled], in my office today. [Please see my note below.] : Please see my note below. [Consult Closing:] : Thank you very much for allowing me to participate in the care of this patient.  If you have any questions, please do not hesitate to contact me. [Sincerely,] : Sincerely, [FreeTextEntry3] : Kip Snell M.D.\par

## 2021-06-07 LAB
ACE BLD-CCNC: 41 U/L
ALBUMIN SERPL ELPH-MCNC: 4.3 G/DL
ALP BLD-CCNC: 176 U/L
ALT SERPL-CCNC: 16 U/L
ANION GAP SERPL CALC-SCNC: 11 MMOL/L
AST SERPL-CCNC: 19 U/L
BASOPHILS # BLD AUTO: 0.05 K/UL
BASOPHILS NFR BLD AUTO: 0.7 %
BILIRUB DIRECT SERPL-MCNC: 0.1 MG/DL
BILIRUB SERPL-MCNC: 0.2 MG/DL
BUN SERPL-MCNC: 16 MG/DL
CALCIUM SERPL-MCNC: 9.5 MG/DL
CHLORIDE SERPL-SCNC: 103 MMOL/L
CO2 SERPL-SCNC: 27 MMOL/L
CREAT SERPL-MCNC: 0.76 MG/DL
EOSINOPHIL # BLD AUTO: 0.19 K/UL
EOSINOPHIL NFR BLD AUTO: 2.5 %
FERRITIN SERPL-MCNC: 8 NG/ML
GGT SERPL-CCNC: 57 U/L
GLUCOSE SERPL-MCNC: 98 MG/DL
HCT VFR BLD CALC: 37.3 %
HGB BLD-MCNC: 11.3 G/DL
IMM GRANULOCYTES NFR BLD AUTO: 0.3 %
IRON SATN MFR SERPL: 7 %
IRON SERPL-MCNC: 31 UG/DL
LYMPHOCYTES # BLD AUTO: 1.36 K/UL
LYMPHOCYTES NFR BLD AUTO: 18 %
MAGNESIUM SERPL-MCNC: 2.2 MG/DL
MAN DIFF?: NORMAL
MCHC RBC-ENTMCNC: 28.2 PG
MCHC RBC-ENTMCNC: 30.3 GM/DL
MCV RBC AUTO: 93 FL
MONOCYTES # BLD AUTO: 0.54 K/UL
MONOCYTES NFR BLD AUTO: 7.1 %
NEUTROPHILS # BLD AUTO: 5.4 K/UL
NEUTROPHILS NFR BLD AUTO: 71.4 %
PHOSPHATE SERPL-MCNC: 2.9 MG/DL
PLATELET # BLD AUTO: 287 K/UL
POTASSIUM SERPL-SCNC: 4.8 MMOL/L
PROT SERPL-MCNC: 7 G/DL
RBC # BLD: 4.01 M/UL
RBC # FLD: 14.5 %
SODIUM SERPL-SCNC: 142 MMOL/L
TIBC SERPL-MCNC: 426 UG/DL
UIBC SERPL-MCNC: 395 UG/DL
WBC # FLD AUTO: 7.56 K/UL

## 2021-06-08 ENCOUNTER — OUTPATIENT (OUTPATIENT)
Dept: OUTPATIENT SERVICES | Facility: HOSPITAL | Age: 66
LOS: 1 days | End: 2021-06-08
Payer: COMMERCIAL

## 2021-06-08 ENCOUNTER — RESULT REVIEW (OUTPATIENT)
Age: 66
End: 2021-06-08

## 2021-06-08 ENCOUNTER — APPOINTMENT (OUTPATIENT)
Dept: CT IMAGING | Facility: IMAGING CENTER | Age: 66
End: 2021-06-08
Payer: COMMERCIAL

## 2021-06-08 DIAGNOSIS — Z00.8 ENCOUNTER FOR OTHER GENERAL EXAMINATION: ICD-10-CM

## 2021-06-08 PROCEDURE — 74177 CT ABD & PELVIS W/CONTRAST: CPT | Mod: 26

## 2021-06-08 PROCEDURE — 74177 CT ABD & PELVIS W/CONTRAST: CPT

## 2021-06-11 ENCOUNTER — NON-APPOINTMENT (OUTPATIENT)
Age: 66
End: 2021-06-11

## 2021-06-14 ENCOUNTER — NON-APPOINTMENT (OUTPATIENT)
Age: 66
End: 2021-06-14

## 2021-06-21 ENCOUNTER — APPOINTMENT (OUTPATIENT)
Dept: PULMONOLOGY | Facility: CLINIC | Age: 66
End: 2021-06-21
Payer: MEDICARE

## 2021-06-21 VITALS
SYSTOLIC BLOOD PRESSURE: 126 MMHG | DIASTOLIC BLOOD PRESSURE: 80 MMHG | OXYGEN SATURATION: 92 % | HEART RATE: 69 BPM | TEMPERATURE: 98.3 F

## 2021-06-21 PROCEDURE — 36415 COLL VENOUS BLD VENIPUNCTURE: CPT

## 2021-06-21 PROCEDURE — 99072 ADDL SUPL MATRL&STAF TM PHE: CPT

## 2021-06-21 PROCEDURE — 99213 OFFICE O/P EST LOW 20 MIN: CPT | Mod: 25

## 2021-06-21 NOTE — PROCEDURE
[FreeTextEntry1] : compliance data reviewed :\par \par 6/1/2021-6/17/2021\par % days used : 100 %\par avg use > 4 hours:  100%\par avg use: 7 hrs & 24  min\par avg pressure 14 cmh20\par avg AHI 5\par minimal leak.\par

## 2021-06-21 NOTE — HISTORY OF PRESENT ILLNESS
[TextBox_4] : LILIANE MILLER is a 66 year old female who presents to f/u on JAIME\par diagnosed with severe JAIME via HST\par has been on auto cpap for months\par still tired during the day\par thyroid functional normal in feb 2021\par seeing GI re: abnormal lab work\par on ppi and h2 blocker for her cough\par \par does not feel well rested on cpap\par sleepy / hard to get through her day\par no new medications\par

## 2021-06-21 NOTE — PHYSICAL EXAM
[No Acute Distress] : no acute distress [Well Nourished] : well nourished [Well Developed] : well developed [Normal Rate/Rhythm] : normal rate/rhythm [No Resp Distress] : no resp distress [No Acc Muscle Use] : no acc muscle use [Clear to Auscultation Bilaterally] : clear to auscultation bilaterally [Oriented x3] : oriented x3

## 2021-06-22 LAB
25(OH)D3 SERPL-MCNC: 37.3 NG/ML
VIT B12 SERPL-MCNC: 348 PG/ML

## 2021-08-26 ENCOUNTER — TRANSCRIPTION ENCOUNTER (OUTPATIENT)
Age: 66
End: 2021-08-26

## 2021-09-07 ENCOUNTER — TRANSCRIPTION ENCOUNTER (OUTPATIENT)
Age: 66
End: 2021-09-07

## 2021-09-14 ENCOUNTER — TRANSCRIPTION ENCOUNTER (OUTPATIENT)
Age: 66
End: 2021-09-14

## 2021-09-15 ENCOUNTER — TRANSCRIPTION ENCOUNTER (OUTPATIENT)
Age: 66
End: 2021-09-15

## 2021-12-15 ENCOUNTER — APPOINTMENT (OUTPATIENT)
Dept: PULMONOLOGY | Facility: CLINIC | Age: 66
End: 2021-12-15
Payer: MEDICARE

## 2021-12-15 VITALS
DIASTOLIC BLOOD PRESSURE: 83 MMHG | OXYGEN SATURATION: 93 % | HEART RATE: 76 BPM | SYSTOLIC BLOOD PRESSURE: 127 MMHG | TEMPERATURE: 98.4 F

## 2021-12-15 PROCEDURE — 71046 X-RAY EXAM CHEST 2 VIEWS: CPT

## 2021-12-15 PROCEDURE — 99214 OFFICE O/P EST MOD 30 MIN: CPT | Mod: 25

## 2021-12-15 NOTE — PROCEDURE
[FreeTextEntry1] : CXR: clear lungs, no focal consolidation or pleural effusions, cardiac silhouette appears normal.  No bony abnormality.\par \par \par Prior exam:\par HST reviewed see chart, severe JAIME

## 2021-12-15 NOTE — HISTORY OF PRESENT ILLNESS
[TextBox_4] : severe rika on auto cpap\par still tired\par insurance wouldn't approve in lab sleep study\par noted to have low O2, she says this is chronic\par \par also report recent cold and persistent cough/congestion\par no fever/chills

## 2021-12-15 NOTE — ASSESSMENT
[FreeTextEntry1] : given severe JAIME, low O2, would like to r/o hypoventilation, obtain ABG, may require bipap, will try for in lab cpap/bipap titration.\par \par \par medrol pack for bronchitis

## 2021-12-29 ENCOUNTER — APPOINTMENT (OUTPATIENT)
Dept: PULMONOLOGY | Facility: CLINIC | Age: 66
End: 2021-12-29
Payer: MEDICARE

## 2021-12-29 PROCEDURE — 82803 BLOOD GASES ANY COMBINATION: CPT

## 2021-12-29 PROCEDURE — 36600 WITHDRAWAL OF ARTERIAL BLOOD: CPT | Mod: 59

## 2022-01-11 ENCOUNTER — APPOINTMENT (OUTPATIENT)
Dept: INTERNAL MEDICINE | Facility: CLINIC | Age: 67
End: 2022-01-11
Payer: MEDICARE

## 2022-01-11 VITALS
TEMPERATURE: 98.7 F | HEART RATE: 73 BPM | OXYGEN SATURATION: 96 % | DIASTOLIC BLOOD PRESSURE: 84 MMHG | BODY MASS INDEX: 36.78 KG/M2 | WEIGHT: 205 LBS | SYSTOLIC BLOOD PRESSURE: 151 MMHG | HEIGHT: 62.5 IN

## 2022-01-11 DIAGNOSIS — H35.373 PUCKERING OF MACULA, BILATERAL: ICD-10-CM

## 2022-01-11 DIAGNOSIS — Z23 ENCOUNTER FOR IMMUNIZATION: ICD-10-CM

## 2022-01-11 DIAGNOSIS — Z87.898 PERSONAL HISTORY OF OTHER SPECIFIED CONDITIONS: ICD-10-CM

## 2022-01-11 DIAGNOSIS — Z86.39 PERSONAL HISTORY OF OTHER ENDOCRINE, NUTRITIONAL AND METABOLIC DISEASE: ICD-10-CM

## 2022-01-11 DIAGNOSIS — R09.02 HYPOXEMIA: ICD-10-CM

## 2022-01-11 PROCEDURE — 99203 OFFICE O/P NEW LOW 30 MIN: CPT

## 2022-01-11 RX ORDER — METHYLPREDNISOLONE 4 MG/1
4 TABLET ORAL
Qty: 1 | Refills: 0 | Status: DISCONTINUED | COMMUNITY
Start: 2021-12-15 | End: 2022-01-11

## 2022-01-11 NOTE — HEALTH RISK ASSESSMENT
[0] : 2) Feeling down, depressed, or hopeless: Not at all (0) [PHQ-2 Negative - No further assessment needed] : PHQ-2 Negative - No further assessment needed [[id number]] : 0

## 2022-01-11 NOTE — HISTORY OF PRESENT ILLNESS
[FreeTextEntry8] : 65 y/o female patient is new to the office presents today to establish care:\par - c/o persistent right ear pain, was seen at Urgent care on 12/27 given Augmentin x 10 day course, patient completed but states symptoms did not resolve, denies any bleeding, no discharge from ear \par - c/o chronic cough - needs refills of nasal sprays, states was never evaluated for asthma, unable to complete PFT, following with pulm

## 2022-01-11 NOTE — REVIEW OF SYSTEMS
[Fever] : no fever [Chills] : no chills [Fatigue] : no fatigue [Earache] : earache [Hearing Loss] : no hearing loss [Nosebleed] : no nosebleeds [Hoarseness] : no hoarseness [Nasal Discharge] : no nasal discharge [Sore Throat] : no sore throat [Postnasal Drip] : postnasal drip [Shortness Of Breath] : shortness of breath [Wheezing] : no wheezing [Cough] : cough [Dyspnea on Exertion] : no dyspnea on exertion [Negative] : Integumentary

## 2022-01-11 NOTE — PHYSICAL EXAM
[Normal Outer Ear/Nose] : the outer ears and nose were normal in appearance [Normal Oropharynx] : the oropharynx was normal [Normal TMs] : both tympanic membranes were normal [Normal] : no rash [de-identified] : +right auditory canal erythematous, inflamed, no discharge, no cerumen

## 2022-01-19 ENCOUNTER — TRANSCRIPTION ENCOUNTER (OUTPATIENT)
Age: 67
End: 2022-01-19

## 2022-02-09 ENCOUNTER — APPOINTMENT (OUTPATIENT)
Dept: OTOLARYNGOLOGY | Facility: CLINIC | Age: 67
End: 2022-02-09
Payer: MEDICARE

## 2022-02-09 VITALS
WEIGHT: 202 LBS | HEIGHT: 62 IN | BODY MASS INDEX: 37.17 KG/M2 | DIASTOLIC BLOOD PRESSURE: 85 MMHG | TEMPERATURE: 98 F | HEART RATE: 92 BPM | SYSTOLIC BLOOD PRESSURE: 131 MMHG

## 2022-02-09 DIAGNOSIS — H90.5 UNSPECIFIED SENSORINEURAL HEARING LOSS: ICD-10-CM

## 2022-02-09 PROCEDURE — 31231 NASAL ENDOSCOPY DX: CPT

## 2022-02-09 PROCEDURE — 99214 OFFICE O/P EST MOD 30 MIN: CPT | Mod: 25

## 2022-02-09 PROCEDURE — 92567 TYMPANOMETRY: CPT

## 2022-02-09 PROCEDURE — 92557 COMPREHENSIVE HEARING TEST: CPT

## 2022-02-09 NOTE — REVIEW OF SYSTEMS
[Ear Pain] : ear pain [Hearing Loss] : hearing loss [Ear Noises] : ear noises [Nasal Congestion] : nasal congestion [Negative] : Integumentary

## 2022-02-09 NOTE — END OF VISIT
[FreeTextEntry3] : I personally saw and examined Ms. LILIANE MILLER in detail this visit today. I personally reviewed the HPI, PMH, FH, SH, ROS, and medications and allergies. I have spoke to Pushpa Calvert PA-C regarding the history and have personally determined the assessment  and plan of care and documented this myself. i was present and participated in all key portions of the encounter and all procedures noted above. I have made changes in the body of the note where appropriate.\par

## 2022-02-09 NOTE — DATA REVIEWED
[No studies available for review at this time] : No studies available for review at this time [de-identified] : RT:mod to mod-severe mixed HL with shallow C/B tymp\par LT: mild HL 250Hz-500Hz rising to WNL thereafter

## 2022-02-09 NOTE — ASSESSMENT
[FreeTextEntry1] : right middle ear effusion\par - audiology evaluation shows right CHL and type c/b tymp\par - Continue Flonase BID and will add medrol dosepak to see if can get ear to drain\par - f/u 2 weeks and re-eval

## 2022-02-09 NOTE — PROCEDURE
[FreeTextEntry6] : Flexible scope #2 was used. Right nasal passage with normal inferior, middle and superior turbinates. Nasal passage patent with clear middle meatus and sphenoethmoid recess. Left nasal passage with normal inferior, middle and superior turbinates. Nasal passage was patent with clear middle meatus and sphenoethmoid recess. No mucopurulence or polyps appreciated. Nasopharynx clear with right ET orifice clear.

## 2022-02-09 NOTE — HISTORY OF PRESENT ILLNESS
[de-identified] : Patients right ear has been muffled since she was diagnosed with a right ear infection around [place].\par SHe was treated with Augmentin at the time, and continues to use Flonase as needed. \par SHe has a history of tinnitus at baseline, no worsening. No current dizziness or vertigo. She feels she needs to pop the right ear and cannot. She had an episode of bronchitis back in Dec 15th and was given a medrol pack for this however she did not have ear issue at that time. \par \par She has mild coughing and nasal congestion with another cold right now. \par \par \par

## 2022-02-09 NOTE — PHYSICAL EXAM
[Nasal Endoscopy Performed] : nasal endoscopy was performed, see procedure section for findings [Normal] : normal appearance, well groomed, well nourished, and in no acute distress [de-identified] : right blaise serous effusion superiorly noted behind the right TM

## 2022-02-23 ENCOUNTER — APPOINTMENT (OUTPATIENT)
Dept: OTOLARYNGOLOGY | Facility: CLINIC | Age: 67
End: 2022-02-23
Payer: MEDICARE

## 2022-02-23 VITALS
HEIGHT: 62 IN | BODY MASS INDEX: 36.8 KG/M2 | TEMPERATURE: 98.1 F | DIASTOLIC BLOOD PRESSURE: 68 MMHG | WEIGHT: 200 LBS | HEART RATE: 68 BPM | SYSTOLIC BLOOD PRESSURE: 116 MMHG

## 2022-02-23 PROCEDURE — 99214 OFFICE O/P EST MOD 30 MIN: CPT | Mod: 25

## 2022-02-23 PROCEDURE — 69420 INCISION OF EARDRUM: CPT | Mod: RT

## 2022-02-23 NOTE — HISTORY OF PRESENT ILLNESS
[de-identified] : 65 y/o F, with Muffled hearing in right ear since December 2021.  Had tinnitus before this sensation, that has been unchanged.  At last visit noted to have Effusion and CHL on right.  Is on Flonase and did Medrol Dose James without improvement.  Has also been trying to pop her ears without success.   No nasal congestion, however does get the sensation she needs to blow her nose.

## 2022-02-23 NOTE — PROCEDURE
[Same] : same as the Pre Op Dx. [] : M & T [FreeTextEntry3] : Procedure - Cerumen Removal. \par After informed verbal consent is obtained, cerumen is removed from the right ear canal with a curette and suction.  Normal appearing canal following removal.\par  [FreeTextEntry4] : phenol [FreeTextEntry6] : right serous blaise effusion

## 2022-02-23 NOTE — PHYSICAL EXAM
[Normal] : no abnormal secretions [de-identified] : Right with cerumen.  Left clear. [de-identified] : Right with blaise effusion.  Left clear.

## 2022-02-23 NOTE — ASSESSMENT
[FreeTextEntry1] : Right ANABEL and CHL; clear NP on endoscopy:\par - Cerumen removed today\par - Effusion persists despite PO and nasal steroids.  \par - Right tube placed in office today\par - F/U 2 weeks for tube check. \par \par I personally saw and examined Ms. LILIANE MILLER in detail this visit today. I personally reviewed the HPI, PMH, FH, SH, ROS and medications/allergies. I have spoken to LEIDY Butts regarding the history and have personally determined the assessment and plan of care, and documented this myself. I was present and participated in all key portions of the encounter and all procedures noted above. I have made changes in the body of the note where appropriate.\par \par Attesting Faculty: See Attending Signature Below

## 2022-03-30 ENCOUNTER — APPOINTMENT (OUTPATIENT)
Dept: OTOLARYNGOLOGY | Facility: CLINIC | Age: 67
End: 2022-03-30
Payer: MEDICARE

## 2022-03-30 VITALS
WEIGHT: 200 LBS | HEIGHT: 62 IN | HEART RATE: 75 BPM | BODY MASS INDEX: 36.8 KG/M2 | TEMPERATURE: 97.6 F | SYSTOLIC BLOOD PRESSURE: 129 MMHG | DIASTOLIC BLOOD PRESSURE: 82 MMHG

## 2022-03-30 PROCEDURE — 99213 OFFICE O/P EST LOW 20 MIN: CPT | Mod: 25

## 2022-03-30 PROCEDURE — 92557 COMPREHENSIVE HEARING TEST: CPT

## 2022-03-30 PROCEDURE — 92567 TYMPANOMETRY: CPT

## 2022-03-30 PROCEDURE — 69433 CREATE EARDRUM OPENING: CPT | Mod: RT

## 2022-03-30 NOTE — ASSESSMENT
[FreeTextEntry1] : Right ANABEL and CHL:\par - Tube out today on exam with recurrent Effusion. \par - Audiogram today with CHL\par - Right t-tube replaced today and effusion suctioned out\par - floxin gtt x 3 days\par - f/u 1 month for repeat audio\par \par \par \par I personally saw and examined Ms. LILIANE MILLER in detail this visit today. I personally reviewed the HPI, PMH, FH, SH, ROS and medications/allergies. I have spoken to LEIDY Butts regarding the history and have personally determined the assessment and plan of care, and documented this myself. I was present and participated in all key portions of the encounter and all procedures noted above. I have made changes in the body of the note where appropriate.\par \par Attesting Faculty: See Attending Signature Below

## 2022-03-30 NOTE — PHYSICAL EXAM
[Normal] : no abnormal secretions [de-identified] : Right tube out and TM intact with Effusion.  Left clear.

## 2022-03-30 NOTE — HISTORY OF PRESENT ILLNESS
[de-identified] : 65 y/o F with Right ETD and Effusion, s/p Tube placement 2/23/22.  No pain or d/c.  Notes hearing had improved a bit, then had a URI and hearing decreased.    Using Flonase and notes mild nasal congestion and d/c.

## 2022-03-30 NOTE — PROCEDURE
[Same] : same as the Pre Op Dx. [] : M & T [FreeTextEntry4] : phenol [FreeTextEntry6] : serous effusion suctioned. right plastic t-tube placed

## 2022-04-27 ENCOUNTER — APPOINTMENT (OUTPATIENT)
Dept: OTOLARYNGOLOGY | Facility: CLINIC | Age: 67
End: 2022-04-27
Payer: MEDICARE

## 2022-04-27 VITALS — SYSTOLIC BLOOD PRESSURE: 131 MMHG | HEART RATE: 71 BPM | TEMPERATURE: 98.3 F | DIASTOLIC BLOOD PRESSURE: 83 MMHG

## 2022-04-27 PROCEDURE — 99212 OFFICE O/P EST SF 10 MIN: CPT | Mod: 25

## 2022-04-27 PROCEDURE — G0268 REMOVAL OF IMPACTED WAX MD: CPT

## 2022-04-27 PROCEDURE — 92557 COMPREHENSIVE HEARING TEST: CPT

## 2022-04-27 PROCEDURE — 92567 TYMPANOMETRY: CPT

## 2022-04-27 NOTE — HISTORY OF PRESENT ILLNESS
[de-identified] : 65 y/o F with Right ETD and chronic effusion.  S/p Tube placement 2/23/22 and replaced at last visit 3/30/22.  Notes hearing is good.  No pain, no d/c.   using Flonase for mild congestion with good improvement.

## 2022-04-27 NOTE — ASSESSMENT
[FreeTextEntry1] : Right ETD with Tube in place. \par - Audiogram today with resolved CHL and patent tube on right\par - tube check every 3-4 months\par \par Cerumen:\par - Removed in office today\par \par \par I personally saw and examined Ms. LILIANE MILLER in detail this visit today. I personally reviewed the HPI, PMH, FH, SH, ROS and medications/allergies. I have spoken to LEIDY Butts regarding the history and have personally determined the assessment and plan of care, and documented this myself. I was present and participated in all key portions of the encounter and all procedures noted above. I have made changes in the body of the note where appropriate.\par \par Attesting Faculty: See Attending Signature Below

## 2022-04-27 NOTE — PHYSICAL EXAM
[Normal] : mucosa is normal [Midline] : trachea located in midline position [de-identified] : Rt. with cerumen.  Left clear.  [de-identified] : Right tube in place and patient.  Left WNL

## 2022-04-27 NOTE — PROCEDURE
[FreeTextEntry3] : Procedure - Cerumen Removal. \par After informed verbal consent is obtained, cerumen is removed from the right ear canal with an alligator forceps.  Normal appearing canal following removal.\par

## 2022-05-10 ENCOUNTER — APPOINTMENT (OUTPATIENT)
Dept: INTERNAL MEDICINE | Facility: CLINIC | Age: 67
End: 2022-05-10
Payer: MEDICARE

## 2022-05-10 VITALS
RESPIRATION RATE: 16 BRPM | HEIGHT: 62 IN | SYSTOLIC BLOOD PRESSURE: 129 MMHG | TEMPERATURE: 98 F | HEART RATE: 96 BPM | DIASTOLIC BLOOD PRESSURE: 80 MMHG | WEIGHT: 209 LBS | BODY MASS INDEX: 38.46 KG/M2 | OXYGEN SATURATION: 95 %

## 2022-05-10 DIAGNOSIS — H60.91 UNSPECIFIED OTITIS EXTERNA, RIGHT EAR: ICD-10-CM

## 2022-05-10 DIAGNOSIS — F43.21 ADJUSTMENT DISORDER WITH DEPRESSED MOOD: ICD-10-CM

## 2022-05-10 DIAGNOSIS — Z87.09 PERSONAL HISTORY OF OTHER DISEASES OF THE RESPIRATORY SYSTEM: ICD-10-CM

## 2022-05-10 DIAGNOSIS — H61.21 IMPACTED CERUMEN, RIGHT EAR: ICD-10-CM

## 2022-05-10 PROCEDURE — 99214 OFFICE O/P EST MOD 30 MIN: CPT | Mod: 25

## 2022-05-10 RX ORDER — NEOMYCIN AND POLYMYXIN B SULFATES AND HYDROCORTISONE OTIC 10; 3.5; 1 MG/ML; MG/ML; [USP'U]/ML
3.5-10000-1 SUSPENSION AURICULAR (OTIC)
Qty: 1 | Refills: 0 | Status: DISCONTINUED | COMMUNITY
Start: 2022-01-11 | End: 2022-05-10

## 2022-05-10 RX ORDER — ESCITALOPRAM OXALATE 10 MG/1
10 TABLET ORAL
Refills: 0 | Status: DISCONTINUED | COMMUNITY
End: 2022-05-10

## 2022-05-10 RX ORDER — OFLOXACIN OTIC 3 MG/ML
0.3 SOLUTION AURICULAR (OTIC) TWICE DAILY
Qty: 1 | Refills: 1 | Status: DISCONTINUED | COMMUNITY
Start: 2022-02-23 | End: 2022-05-10

## 2022-05-10 RX ORDER — BROMPHENIRAMINE MALEATE, PSEUDOEPHEDRINE HYDROCHLORIDE, 2; 30; 10 MG/5ML; MG/5ML; MG/5ML
30-2-10 SYRUP ORAL
Qty: 480 | Refills: 0 | Status: DISCONTINUED | COMMUNITY
Start: 2022-01-11 | End: 2022-05-10

## 2022-05-10 RX ORDER — METHYLPREDNISOLONE 4 MG/1
4 TABLET ORAL
Qty: 1 | Refills: 0 | Status: DISCONTINUED | COMMUNITY
Start: 2022-02-09 | End: 2022-05-10

## 2022-05-10 NOTE — REVIEW OF SYSTEMS
[Fever] : no fever [Chills] : no chills [Fatigue] : no fatigue [Earache] : no earache [Hearing Loss] : no hearing loss [Nosebleed] : no nosebleeds [Hoarseness] : no hoarseness [Nasal Discharge] : no nasal discharge [Sore Throat] : no sore throat [Postnasal Drip] : no postnasal drip [Shortness Of Breath] : no shortness of breath [Wheezing] : no wheezing [Cough] : no cough [Dyspnea on Exertion] : no dyspnea on exertion [Negative] : Psychiatric

## 2022-05-10 NOTE — PHYSICAL EXAM
[Normal Outer Ear/Nose] : the outer ears and nose were normal in appearance [Normal Oropharynx] : the oropharynx was normal [Normal Nasal Mucosa] : the nasal mucosa was normal [No Carotid Bruits] : no carotid bruits [No Abdominal Bruit] : a ~M bruit was not heard ~T in the abdomen [No Edema] : there was no peripheral edema [Normal] : affect was normal and insight and judgment were intact [de-identified] : Tympanostomy tube in place in right TM, no drainage

## 2022-05-10 NOTE — HISTORY OF PRESENT ILLNESS
[de-identified] : 67 y/o female patient presents today:\par - f/u HTN/Pre-DM/elevated LFT's - recently seen by cardiology, changed Losartan -->Valsartan, BP well controlled, denies any CP/SOB/Palpitations/edema, last echo/stress test in October need records sent to office \par - f/u hypothyroidism - compliant with medication, check labs \par

## 2022-06-02 ENCOUNTER — TRANSCRIPTION ENCOUNTER (OUTPATIENT)
Age: 67
End: 2022-06-02

## 2022-06-02 LAB
25(OH)D3 SERPL-MCNC: 49.7 NG/ML
ALBUMIN SERPL ELPH-MCNC: 4.6 G/DL
ALP BLD-CCNC: 212 U/L
ALT SERPL-CCNC: 21 U/L
ANION GAP SERPL CALC-SCNC: 14 MMOL/L
APPEARANCE: CLEAR
AST SERPL-CCNC: 22 U/L
BACTERIA: NEGATIVE
BASOPHILS # BLD AUTO: 0.03 K/UL
BASOPHILS NFR BLD AUTO: 0.4 %
BILIRUB SERPL-MCNC: 0.3 MG/DL
BILIRUBIN URINE: NEGATIVE
BLOOD URINE: NEGATIVE
BUN SERPL-MCNC: 18 MG/DL
CALCIUM SERPL-MCNC: 9.9 MG/DL
CHLORIDE SERPL-SCNC: 103 MMOL/L
CHOLEST SERPL-MCNC: 191 MG/DL
CO2 SERPL-SCNC: 26 MMOL/L
COLOR: NORMAL
CREAT SERPL-MCNC: 0.74 MG/DL
CREAT SPEC-SCNC: 54 MG/DL
EGFR: 90 ML/MIN/1.73M2
EOSINOPHIL # BLD AUTO: 0.22 K/UL
EOSINOPHIL NFR BLD AUTO: 3.3 %
ESTIMATED AVERAGE GLUCOSE: 123 MG/DL
FERRITIN SERPL-MCNC: 18 NG/ML
GLUCOSE QUALITATIVE U: NEGATIVE
GLUCOSE SERPL-MCNC: 98 MG/DL
HBA1C MFR BLD HPLC: 5.9 %
HCT VFR BLD CALC: 42 %
HDLC SERPL-MCNC: 98 MG/DL
HGB BLD-MCNC: 13.2 G/DL
HYALINE CASTS: 1 /LPF
IMM GRANULOCYTES NFR BLD AUTO: 0.1 %
IRON SATN MFR SERPL: 18 %
IRON SERPL-MCNC: 74 UG/DL
KETONES URINE: NEGATIVE
LDLC SERPL CALC-MCNC: 81 MG/DL
LEUKOCYTE ESTERASE URINE: NEGATIVE
LYMPHOCYTES # BLD AUTO: 1.05 K/UL
LYMPHOCYTES NFR BLD AUTO: 15.6 %
MAGNESIUM SERPL-MCNC: 2.1 MG/DL
MAN DIFF?: NORMAL
MCHC RBC-ENTMCNC: 29.5 PG
MCHC RBC-ENTMCNC: 31.4 GM/DL
MCV RBC AUTO: 93.8 FL
MICROALBUMIN 24H UR DL<=1MG/L-MCNC: <1.2 MG/DL
MICROALBUMIN/CREAT 24H UR-RTO: NORMAL MG/G
MICROSCOPIC-UA: NORMAL
MONOCYTES # BLD AUTO: 0.52 K/UL
MONOCYTES NFR BLD AUTO: 7.7 %
NEUTROPHILS # BLD AUTO: 4.92 K/UL
NEUTROPHILS NFR BLD AUTO: 72.9 %
NITRITE URINE: NEGATIVE
NONHDLC SERPL-MCNC: 93 MG/DL
PH URINE: 6
PLATELET # BLD AUTO: 257 K/UL
POTASSIUM SERPL-SCNC: 4.4 MMOL/L
PROT SERPL-MCNC: 7 G/DL
PROTEIN URINE: NEGATIVE
RBC # BLD: 4.48 M/UL
RBC # FLD: 14.3 %
RED BLOOD CELLS URINE: 1 /HPF
SODIUM SERPL-SCNC: 142 MMOL/L
SPECIFIC GRAVITY URINE: 1.01
SQUAMOUS EPITHELIAL CELLS: 4 /HPF
T3FREE SERPL-MCNC: 2.7 PG/ML
T4 FREE SERPL-MCNC: 1.5 NG/DL
THYROGLOB AB SERPL-ACNC: <20 IU/ML
THYROPEROXIDASE AB SERPL IA-ACNC: <10 IU/ML
TIBC SERPL-MCNC: 402 UG/DL
TRANSFERRIN SERPL-MCNC: 312 MG/DL
TRIGL SERPL-MCNC: 57 MG/DL
TSH SERPL-ACNC: 2.64 UIU/ML
UIBC SERPL-MCNC: 329 UG/DL
UROBILINOGEN URINE: NORMAL
WBC # FLD AUTO: 6.75 K/UL
WHITE BLOOD CELLS URINE: 1 /HPF

## 2022-06-07 NOTE — ASU PREOP CHECKLIST - PATIENT SENT TO
06/07/22      Elizabeth Warner  [address] [facility] [address]      Thank you for talking with me on the phone on 6/7622. My name is Petty Mann RN and I’m a care manager with [facility].     Care Management is designed to help you get the best health care there is for your health issues. Taking part in Care Management is a free, confidential service that helps you and/or your family with your healthcare needs. When we spoke, I told you that I called you because your illness or healthcare needs may be complex or challenging.     As your care manager, I work with you and Maurisio Jung MD to get the most helpful care for your health. My job is to:     · Help you understand the type of care you need.   · Educate you about your health care and/or choices.  · Make sure you know where you can find the health care you need.  · Help you/your family find support to meet your healthcare needs.   · Keep your doctor apprised of your treatment and your needs.   · Help you with maximizing your health care benefits.     I will call you on 6/10/22 to see how you are doing. If you have any questions or if I can help you in any way, please call me at     [phone]  8:30 AM to 4:30 PM CST  (Monday-Friday)     If I’m not able to take your call, you can leave a private message and I will call you back within one business day. When I’m working to arrange your health care and benefits, I may talk to your doctors and your health plan about your benefits. But don’t worry, I take your privacy very seriously. That means I won’t ever share any personal information with your family, friends or anyone else unless you say it’s okay. You do have the right not to take part in care management. Simply inform me at any time that you do not want my help.     I look forward to working with you.      Sincerely,    Petty Mann RN  Outpatient?Care?Manager?   [facility]  
operating room

## 2022-07-27 ENCOUNTER — APPOINTMENT (OUTPATIENT)
Dept: OTOLARYNGOLOGY | Facility: CLINIC | Age: 67
End: 2022-07-27

## 2022-07-27 VITALS
DIASTOLIC BLOOD PRESSURE: 72 MMHG | WEIGHT: 185 LBS | HEIGHT: 62 IN | TEMPERATURE: 97.6 F | BODY MASS INDEX: 34.04 KG/M2 | HEART RATE: 70 BPM | SYSTOLIC BLOOD PRESSURE: 102 MMHG

## 2022-07-27 PROCEDURE — 99212 OFFICE O/P EST SF 10 MIN: CPT

## 2022-07-27 NOTE — HISTORY OF PRESENT ILLNESS
[de-identified] : 65 y/o F with Right ETD and chronic effusion.  S/p Tube placement 2/23/22 and replaced 3/30/22.  Notes hearing is good.  No pain, no d/c.   using Flonase for mild congestion with good improvement.

## 2022-07-27 NOTE — PHYSICAL EXAM
[Normal] : external ears are normal bilaterally [de-identified] : Right tube in place and patient.  Left WNL

## 2022-07-27 NOTE — ASSESSMENT
[FreeTextEntry1] : Right ETD with Tube in place. \par - tube check every 4 months\par \par \par \par I personally saw and examined Ms. LILIANE MILLER in detail this visit today. I personally reviewed the HPI, PMH, FH, SH, ROS and medications/allergies. I have spoken to LEIDY Butts regarding the history and have personally determined the assessment and plan of care, and documented this myself. I was present and participated in all key portions of the encounter and all procedures noted above. I have made changes in the body of the note where appropriate.\par \par Attesting Faculty: See Attending Signature Below

## 2022-09-02 ENCOUNTER — TRANSCRIPTION ENCOUNTER (OUTPATIENT)
Age: 67
End: 2022-09-02

## 2022-09-28 ENCOUNTER — TRANSCRIPTION ENCOUNTER (OUTPATIENT)
Age: 67
End: 2022-09-28

## 2022-11-15 ENCOUNTER — APPOINTMENT (OUTPATIENT)
Dept: INTERNAL MEDICINE | Facility: CLINIC | Age: 67
End: 2022-11-15

## 2022-11-15 VITALS
HEART RATE: 88 BPM | SYSTOLIC BLOOD PRESSURE: 136 MMHG | RESPIRATION RATE: 16 BRPM | WEIGHT: 189 LBS | OXYGEN SATURATION: 96 % | BODY MASS INDEX: 34.78 KG/M2 | HEIGHT: 62 IN | TEMPERATURE: 98 F | DIASTOLIC BLOOD PRESSURE: 93 MMHG

## 2022-11-15 VITALS — SYSTOLIC BLOOD PRESSURE: 139 MMHG | DIASTOLIC BLOOD PRESSURE: 88 MMHG

## 2022-11-15 DIAGNOSIS — H65.91 UNSPECIFIED NONSUPPURATIVE OTITIS MEDIA, RIGHT EAR: ICD-10-CM

## 2022-11-15 DIAGNOSIS — G47.33 OBSTRUCTIVE SLEEP APNEA (ADULT) (PEDIATRIC): ICD-10-CM

## 2022-11-15 DIAGNOSIS — Z87.898 PERSONAL HISTORY OF OTHER SPECIFIED CONDITIONS: ICD-10-CM

## 2022-11-15 DIAGNOSIS — Z99.89 OBSTRUCTIVE SLEEP APNEA (ADULT) (PEDIATRIC): ICD-10-CM

## 2022-11-15 PROCEDURE — 36415 COLL VENOUS BLD VENIPUNCTURE: CPT

## 2022-11-15 PROCEDURE — 99213 OFFICE O/P EST LOW 20 MIN: CPT | Mod: 25

## 2022-11-15 PROCEDURE — G0438: CPT

## 2022-11-15 RX ORDER — OXYCODONE AND ACETAMINOPHEN 5; 325 MG/1; MG/1
5-325 TABLET ORAL
Qty: 25 | Refills: 0 | Status: DISCONTINUED | COMMUNITY
Start: 2022-09-28

## 2022-11-15 NOTE — HISTORY OF PRESENT ILLNESS
[de-identified] : 66 y/o female patient presents today for AWV:\par - HCM: mammogram/breast US - 7/2022, DEXA -7/2022, normal, PAP-7/2022, colonoscopy -7/2022\par - f/u HTN/LBBB/Pre-DM - labs done end of September by cardiology Dr. Littlejohn, reviewed with patient, well controlled, HbA1C: 5.7, BP well controlled, denies any CP/SOB/Palpitations/edema

## 2022-11-15 NOTE — HEALTH RISK ASSESSMENT
[Never] : Never [Yes] : Yes [No] : In the past 12 months have you used drugs other than those required for medical reasons? No [No falls in past year] : Patient reported no falls in the past year [0] : 2) Feeling down, depressed, or hopeless: Not at all (0) [PHQ-2 Negative - No further assessment needed] : PHQ-2 Negative - No further assessment needed [[id number]] : 0 [Patient reported mammogram was normal] : Patient reported mammogram was normal [Patient reported PAP Smear was normal] : Patient reported PAP Smear was normal [Patient reported bone density results were normal] : Patient reported bone density results were normal [Patient reported colonoscopy was normal] : Patient reported colonoscopy was normal [Change in mental status noted] : No change in mental status noted [None] : None [With Family] : lives with family [Employed] : employed [] :  [Feels Safe at Home] : Feels safe at home [Fully functional (bathing, dressing, toileting, transferring, walking, feeding)] : Fully functional (bathing, dressing, toileting, transferring, walking, feeding) [Fully functional (using the telephone, shopping, preparing meals, housekeeping, doing laundry, using] : Fully functional and needs no help or supervision to perform IADLs (using the telephone, shopping, preparing meals, housekeeping, doing laundry, using transportation, managing medications and managing finances) [Reports changes in hearing] : Reports no changes in hearing [Reports changes in vision] : Reports no changes in vision [Reports changes in dental health] : Reports no changes in dental health [MammogramDate] : 7/2022 [PapSmearDate] : 7/2022 [BoneDensityDate] : 7/2022 [ColonoscopyDate] : 7/2022

## 2022-11-23 ENCOUNTER — APPOINTMENT (OUTPATIENT)
Dept: OTOLARYNGOLOGY | Facility: CLINIC | Age: 67
End: 2022-11-23

## 2022-11-23 ENCOUNTER — NON-APPOINTMENT (OUTPATIENT)
Age: 67
End: 2022-11-23

## 2022-11-23 VITALS
TEMPERATURE: 98 F | DIASTOLIC BLOOD PRESSURE: 73 MMHG | BODY MASS INDEX: 34.78 KG/M2 | HEIGHT: 62 IN | SYSTOLIC BLOOD PRESSURE: 107 MMHG | WEIGHT: 189 LBS | HEART RATE: 73 BPM

## 2022-11-23 PROCEDURE — 99213 OFFICE O/P EST LOW 20 MIN: CPT

## 2022-11-23 NOTE — PHYSICAL EXAM
[Normal] : external ears are normal bilaterally [de-identified] : Right tube out in canal.  Small perforation persists.  Left WNL

## 2022-11-23 NOTE — HISTORY OF PRESENT ILLNESS
[de-identified] : 67 y/o F with Right ET disfunction and chronic effusion.  S/p Tube placement 2/23/22 and replaced 3/30/22.  Notes hearing is good.  No pain, no d/c.   using Flonase for minimal nasal congestion at night, but doing well.

## 2022-11-23 NOTE — ASSESSMENT
[FreeTextEntry1] : Right ET disfunction with Tube is out and in canal, small perforation:\par - Will see how she does when TM heals. \par - F/U in 3 months, sooner if symptoms return. \par \par \par

## 2022-11-23 NOTE — END OF VISIT
[FreeTextEntry3] : I personally saw and examined Ms. LILIANE MILLER in detail this visit today. I personally reviewed the HPI, PMH, FH, SH, ROS and medications/allergies. I have spoken to LEIDY Butts regarding the history and have personally determined the assessment and plan of care, and documented this myself. I was present and participated in all key portions of the encounter and all procedures noted above. I have made changes in the body of the note where appropriate.\par \par Attesting Faculty: See Attending Signature Below

## 2022-12-29 ENCOUNTER — APPOINTMENT (OUTPATIENT)
Dept: INTERNAL MEDICINE | Facility: CLINIC | Age: 67
End: 2022-12-29

## 2022-12-29 NOTE — HISTORY OF PRESENT ILLNESS
[Home] : at home, [unfilled] , at the time of the visit. [Medical Office: ([facility])___] : at the medical office located in  [Verbal consent obtained from patient] : the patient, [unfilled] [FreeTextEntry8] : LILIANE MILLER  is a 67 year old female  with history of  presented today for cough, congestion, ear pain.

## 2022-12-29 NOTE — PHYSICAL EXAM
[de-identified] : TEB. General: Well-developed well-nourished in no apparent distress. Appears mildly ill. \par Respiratory: Speaking in complete sentences, no respiratory distress noted.\par Neuro: No focal deficits noted.\par

## 2023-01-05 NOTE — ASSESSMENT
----- Message from Leanna Crenshaw sent at 1/5/2023  9:30 AM EST -----  Regarding: eye exam  01/05/23 9:30 AM    Hello, our patient Tanya Kruger has had Diabetic Eye Exam completed/performed  Please assist in updating the patient chart by making an External outreach to [facility] located in [place], [place]  The date of service is 11/2022      Thank you,  Librado Anderson, Pharmacist  [facility] [FreeTextEntry1] : Nasal congestion at night\par - Flonase BID for 4-6 weeks \par - Sinus wash\par - If no improvement with treatment, consider turbinate reduction.\par \par Vasomotor Rhinitis\par - Ipratropium PRN\par \par HTN\par - F/U with PMD\par

## 2023-02-09 LAB
ALBUMIN SERPL ELPH-MCNC: 4.8 G/DL
ALP BLD-CCNC: 192 U/L
ALT SERPL-CCNC: 23 U/L
ANION GAP SERPL CALC-SCNC: 12 MMOL/L
APPEARANCE: CLEAR
AST SERPL-CCNC: 20 U/L
BACTERIA: NEGATIVE
BASOPHILS # BLD AUTO: 0.03 K/UL
BASOPHILS NFR BLD AUTO: 0.4 %
BILIRUB SERPL-MCNC: 0.4 MG/DL
BILIRUBIN URINE: NEGATIVE
BLOOD URINE: NEGATIVE
BUN SERPL-MCNC: 15 MG/DL
CALCIUM OXALATE CRYSTALS: NEGATIVE
CALCIUM SERPL-MCNC: 10 MG/DL
CHLORIDE SERPL-SCNC: 102 MMOL/L
CO2 SERPL-SCNC: 27 MMOL/L
COLOR: YELLOW
CREAT SERPL-MCNC: 0.79 MG/DL
EGFR: 82 ML/MIN/1.73M2
EOSINOPHIL # BLD AUTO: 0.15 K/UL
EOSINOPHIL NFR BLD AUTO: 2 %
FERRITIN SERPL-MCNC: 53 NG/ML
GLUCOSE QUALITATIVE U: NEGATIVE
GLUCOSE SERPL-MCNC: 106 MG/DL
GRANULAR CASTS: 0 /LPF
HCT VFR BLD CALC: 44 %
HGB BLD-MCNC: 14.2 G/DL
HYALINE CASTS: 0 /LPF
IMM GRANULOCYTES NFR BLD AUTO: 0.1 %
IRON SATN MFR SERPL: 28 %
IRON SERPL-MCNC: 101 UG/DL
KETONES URINE: NORMAL
LEUKOCYTE ESTERASE URINE: NEGATIVE
LYMPHOCYTES # BLD AUTO: 1.13 K/UL
LYMPHOCYTES NFR BLD AUTO: 15.3 %
MAN DIFF?: NORMAL
MCHC RBC-ENTMCNC: 31.1 PG
MCHC RBC-ENTMCNC: 32.3 GM/DL
MCV RBC AUTO: 96.5 FL
MICROSCOPIC-UA: NORMAL
MONOCYTES # BLD AUTO: 0.45 K/UL
MONOCYTES NFR BLD AUTO: 6.1 %
NEUTROPHILS # BLD AUTO: 5.6 K/UL
NEUTROPHILS NFR BLD AUTO: 76.1 %
NITRITE URINE: NEGATIVE
PH URINE: 6
PLATELET # BLD AUTO: 251 K/UL
POTASSIUM SERPL-SCNC: 4.9 MMOL/L
PROT SERPL-MCNC: 7.3 G/DL
PROTEIN URINE: ABNORMAL
RBC # BLD: 4.56 M/UL
RBC # FLD: 13.2 %
RED BLOOD CELLS URINE: 3 /HPF
SODIUM SERPL-SCNC: 140 MMOL/L
SPECIFIC GRAVITY URINE: >=1.03
SQUAMOUS EPITHELIAL CELLS: 4 /HPF
TIBC SERPL-MCNC: 356 UG/DL
TRANSFERRIN SERPL-MCNC: 299 MG/DL
TRIPLE PHOSPHATE CRYSTALS: NEGATIVE
UIBC SERPL-MCNC: 255 UG/DL
URIC ACID CRYSTALS: NEGATIVE
URINE COMMENTS: NORMAL
UROBILINOGEN URINE: NORMAL
WBC # FLD AUTO: 7.37 K/UL
WHITE BLOOD CELLS URINE: 4 /HPF

## 2023-02-15 ENCOUNTER — APPOINTMENT (OUTPATIENT)
Dept: OTOLARYNGOLOGY | Facility: CLINIC | Age: 68
End: 2023-02-15
Payer: MEDICARE

## 2023-02-15 VITALS
BODY MASS INDEX: 34.96 KG/M2 | HEIGHT: 62 IN | WEIGHT: 190 LBS | TEMPERATURE: 97.8 F | HEART RATE: 67 BPM | DIASTOLIC BLOOD PRESSURE: 83 MMHG | SYSTOLIC BLOOD PRESSURE: 134 MMHG

## 2023-02-15 PROCEDURE — 99212 OFFICE O/P EST SF 10 MIN: CPT

## 2023-02-15 NOTE — HISTORY OF PRESENT ILLNESS
[de-identified] : 65 y/o F with Right ET disfunction and chronic effusion.  S/p Tube placement 2/23/22 and replaced 3/30/22.  Last visit tube was out but she had a tiny residual perforation. She notes since last vist her ears feel good and hearing is good. She had COVID and had some ear pressure but no drainage and the ears resolved as the infection cleared.

## 2023-02-15 NOTE — ASSESSMENT
[FreeTextEntry1] : Hx right ETD s/p tube; out and well healed without effusion:\par - TM healed and no recurrent effusion or symptoms\par - she declines audiogram today as she feels her hearing is good\par - f/u PRN\par \par \par

## 2023-03-30 ENCOUNTER — RX RENEWAL (OUTPATIENT)
Age: 68
End: 2023-03-30

## 2023-04-21 ENCOUNTER — RX RENEWAL (OUTPATIENT)
Age: 68
End: 2023-04-21

## 2023-05-15 ENCOUNTER — APPOINTMENT (OUTPATIENT)
Dept: INTERNAL MEDICINE | Facility: CLINIC | Age: 68
End: 2023-05-15
Payer: MEDICARE

## 2023-05-15 VITALS
BODY MASS INDEX: 33.31 KG/M2 | OXYGEN SATURATION: 97 % | HEART RATE: 79 BPM | HEIGHT: 62 IN | TEMPERATURE: 98 F | WEIGHT: 181 LBS | DIASTOLIC BLOOD PRESSURE: 76 MMHG | SYSTOLIC BLOOD PRESSURE: 108 MMHG

## 2023-05-15 DIAGNOSIS — K21.9 GASTRO-ESOPHAGEAL REFLUX DISEASE W/OUT ESOPHAGITIS: ICD-10-CM

## 2023-05-15 DIAGNOSIS — H69.81 OTHER SPECIFIED DISORDERS OF EUSTACHIAN TUBE, RIGHT EAR: ICD-10-CM

## 2023-05-15 PROCEDURE — 99214 OFFICE O/P EST MOD 30 MIN: CPT | Mod: 25

## 2023-05-15 PROCEDURE — 36415 COLL VENOUS BLD VENIPUNCTURE: CPT

## 2023-05-15 RX ORDER — ALBUTEROL SULFATE 90 UG/1
108 (90 BASE) INHALANT RESPIRATORY (INHALATION) EVERY 4 HOURS
Qty: 1 | Refills: 3 | Status: DISCONTINUED | COMMUNITY
Start: 2022-01-11 | End: 2023-05-15

## 2023-05-15 RX ORDER — PANTOPRAZOLE SODIUM 40 MG/1
40 GRANULE, DELAYED RELEASE ORAL
Refills: 0 | Status: DISCONTINUED | COMMUNITY
End: 2023-05-15

## 2023-05-15 RX ORDER — SODIUM SULFATE, MAGNESIUM SULFATE, AND POTASSIUM CHLORIDE 17.75; 2.7; 2.25 G/1; G/1; G/1
1479-225-188 TABLET ORAL
Qty: 24 | Refills: 0 | Status: DISCONTINUED | COMMUNITY
Start: 2022-07-07 | End: 2023-05-15

## 2023-05-15 RX ORDER — UBIDECARENONE 100 MG
100 CAPSULE ORAL
Refills: 0 | Status: ACTIVE | COMMUNITY

## 2023-05-15 RX ORDER — ADHESIVE TAPE 3"X 2.3 YD
50 MCG TAPE, NON-MEDICATED TOPICAL
Refills: 0 | Status: ACTIVE | COMMUNITY

## 2023-05-15 RX ORDER — IBUPROFEN 600 MG/1
600 TABLET, FILM COATED ORAL
Qty: 60 | Refills: 0 | Status: ACTIVE | COMMUNITY
Start: 2022-09-28

## 2023-05-15 RX ORDER — SUCRALFATE 1 G/1
1 TABLET ORAL
Qty: 120 | Refills: 0 | Status: DISCONTINUED | COMMUNITY
Start: 2022-07-19 | End: 2023-05-15

## 2023-05-15 RX ORDER — ASPIRIN 81 MG/1
81 TABLET, CHEWABLE ORAL
Refills: 0 | Status: ACTIVE | COMMUNITY

## 2023-05-15 RX ORDER — FAMOTIDINE 20 MG/1
20 TABLET, FILM COATED ORAL
Refills: 0 | Status: ACTIVE | COMMUNITY

## 2023-05-29 NOTE — PHYSICAL EXAM
[No Carotid Bruits] : no carotid bruits [No Abdominal Bruit] : a ~M bruit was not heard ~T in the abdomen [No Edema] : there was no peripheral edema [Normal] : normal gait, coordination grossly intact, no focal deficits and deep tendon reflexes were 2+ and symmetric

## 2023-05-29 NOTE — HISTORY OF PRESENT ILLNESS
[de-identified] : 68 y/o female patient presents today:\par - f/u HTN/LBBB/Pre-DM/obesity - BP well controlled, denies any CP/SOB/Palpitations/edema\par - f/u hypothyroidism - stable, compliant with medication, denies any side effects

## 2023-06-30 ENCOUNTER — APPOINTMENT (OUTPATIENT)
Dept: INTERNAL MEDICINE | Facility: CLINIC | Age: 68
End: 2023-06-30
Payer: MEDICARE

## 2023-06-30 VITALS
BODY MASS INDEX: 33.86 KG/M2 | TEMPERATURE: 98.3 F | SYSTOLIC BLOOD PRESSURE: 123 MMHG | HEIGHT: 62 IN | HEART RATE: 74 BPM | DIASTOLIC BLOOD PRESSURE: 84 MMHG | RESPIRATION RATE: 16 BRPM | OXYGEN SATURATION: 94 % | WEIGHT: 184 LBS

## 2023-06-30 LAB
ALBUMIN SERPL ELPH-MCNC: 4.6 G/DL
ALP BLD-CCNC: 179 U/L
ALT SERPL-CCNC: 29 U/L
ANION GAP SERPL CALC-SCNC: 15 MMOL/L
APPEARANCE: ABNORMAL
AST SERPL-CCNC: 36 U/L
BACTERIA: ABNORMAL /HPF
BASOPHILS # BLD AUTO: 0.03 K/UL
BASOPHILS NFR BLD AUTO: 0.5 %
BILIRUB SERPL-MCNC: 0.3 MG/DL
BILIRUBIN URINE: NEGATIVE
BLOOD URINE: NEGATIVE
BUN SERPL-MCNC: 21 MG/DL
CALCIUM OXALATE CRYSTALS: PRESENT
CALCIUM SERPL-MCNC: 10.1 MG/DL
CAST: 1 /LPF
CHLORIDE SERPL-SCNC: 106 MMOL/L
CHOLEST SERPL-MCNC: 205 MG/DL
CO2 SERPL-SCNC: 26 MMOL/L
COLOR: YELLOW
CREAT SERPL-MCNC: 0.69 MG/DL
EGFR: 96 ML/MIN/1.73M2
EOSINOPHIL # BLD AUTO: 0.27 K/UL
EOSINOPHIL NFR BLD AUTO: 4.5 %
EPITHELIAL CELLS: 18 /HPF
ESTIMATED AVERAGE GLUCOSE: 111 MG/DL
FERRITIN SERPL-MCNC: 73 NG/ML
GLUCOSE QUALITATIVE U: NEGATIVE MG/DL
GLUCOSE SERPL-MCNC: 95 MG/DL
HBA1C MFR BLD HPLC: 5.5 %
HCT VFR BLD CALC: 44.8 %
HDLC SERPL-MCNC: 94 MG/DL
HGB BLD-MCNC: 13.8 G/DL
IMM GRANULOCYTES NFR BLD AUTO: 0.2 %
IRON SATN MFR SERPL: 28 %
IRON SERPL-MCNC: 88 UG/DL
KETONES URINE: NEGATIVE MG/DL
LDLC SERPL CALC-MCNC: 96 MG/DL
LEUKOCYTE ESTERASE URINE: ABNORMAL
LYMPHOCYTES # BLD AUTO: 0.99 K/UL
LYMPHOCYTES NFR BLD AUTO: 16.6 %
MAN DIFF?: NORMAL
MCHC RBC-ENTMCNC: 30.8 GM/DL
MCHC RBC-ENTMCNC: 30.9 PG
MCV RBC AUTO: 100.4 FL
MICROSCOPIC-UA: NORMAL
MONOCYTES # BLD AUTO: 0.42 K/UL
MONOCYTES NFR BLD AUTO: 7 %
NEUTROPHILS # BLD AUTO: 4.24 K/UL
NEUTROPHILS NFR BLD AUTO: 71.2 %
NITRITE URINE: NEGATIVE
NONHDLC SERPL-MCNC: 112 MG/DL
PH URINE: 5.5
PLATELET # BLD AUTO: 229 K/UL
POTASSIUM SERPL-SCNC: 5.5 MMOL/L
PROT SERPL-MCNC: 6.6 G/DL
PROTEIN URINE: NEGATIVE MG/DL
RBC # BLD: 4.46 M/UL
RBC # FLD: 13 %
RED BLOOD CELLS URINE: NORMAL /HPF
REVIEW: NORMAL
SODIUM SERPL-SCNC: 147 MMOL/L
SPECIFIC GRAVITY URINE: 1.03
TIBC SERPL-MCNC: 321 UG/DL
TRIGL SERPL-MCNC: 79 MG/DL
TSH SERPL-ACNC: 0.54 UIU/ML
UIBC SERPL-MCNC: 232 UG/DL
UROBILINOGEN URINE: 0.2 MG/DL
VIT B12 SERPL-MCNC: 344 PG/ML
WBC # FLD AUTO: 5.96 K/UL
WHITE BLOOD CELLS URINE: 7 /HPF

## 2023-06-30 PROCEDURE — 99214 OFFICE O/P EST MOD 30 MIN: CPT

## 2023-06-30 NOTE — PHYSICAL EXAM
[No Acute Distress] : no acute distress [Well Nourished] : well nourished [Well Developed] : well developed [Well-Appearing] : well-appearing [Normal Sclera/Conjunctiva] : normal sclera/conjunctiva [PERRL] : pupils equal round and reactive to light [EOMI] : extraocular movements intact [Normal Outer Ear/Nose] : the outer ears and nose were normal in appearance [Normal Oropharynx] : the oropharynx was normal [No JVD] : no jugular venous distention [No Lymphadenopathy] : no lymphadenopathy [Supple] : supple [Thyroid Normal, No Nodules] : the thyroid was normal and there were no nodules present [No Respiratory Distress] : no respiratory distress  [No Accessory Muscle Use] : no accessory muscle use [Clear to Auscultation] : lungs were clear to auscultation bilaterally [Normal Rate] : normal rate  [Regular Rhythm] : with a regular rhythm [Normal S1, S2] : normal S1 and S2 [No Murmur] : no murmur heard [No Carotid Bruits] : no carotid bruits [No Abdominal Bruit] : a ~M bruit was not heard ~T in the abdomen [No Varicosities] : no varicosities [Pedal Pulses Present] : the pedal pulses are present [No Edema] : there was no peripheral edema [No Palpable Aorta] : no palpable aorta [No Extremity Clubbing/Cyanosis] : no extremity clubbing/cyanosis [Soft] : abdomen soft [Non Tender] : non-tender [Non-distended] : non-distended [No Masses] : no abdominal mass palpated [No HSM] : no HSM [Normal Bowel Sounds] : normal bowel sounds [Normal Posterior Cervical Nodes] : no posterior cervical lymphadenopathy [Normal Anterior Cervical Nodes] : no anterior cervical lymphadenopathy [No CVA Tenderness] : no CVA  tenderness [No Spinal Tenderness] : no spinal tenderness [No Joint Swelling] : no joint swelling [Grossly Normal Strength/Tone] : grossly normal strength/tone [Coordination Grossly Intact] : coordination grossly intact [No Focal Deficits] : no focal deficits [Normal Gait] : normal gait [Deep Tendon Reflexes (DTR)] : deep tendon reflexes were 2+ and symmetric [Normal Affect] : the affect was normal [Normal Insight/Judgement] : insight and judgment were intact [de-identified] : under breast rash

## 2023-06-30 NOTE — HISTORY OF PRESENT ILLNESS
[FreeTextEntry8] : 68 year old female here for b/l rash under her breast. patient states started a few days ago and usually occurs during the warmer weather. patient states rash is itchy and increasing in side. no pain no lesions no drainage.

## 2023-08-23 ENCOUNTER — APPOINTMENT (OUTPATIENT)
Dept: PSYCHIATRY | Facility: CLINIC | Age: 68
End: 2023-08-23
Payer: MEDICARE

## 2023-08-23 PROCEDURE — 90791 PSYCH DIAGNOSTIC EVALUATION: CPT

## 2023-08-29 NOTE — HISTORY OF PRESENT ILLNESS
[FreeTextEntry1] : Pt. is a 69 y/o female who presents for psychotherapy treatment. Pt. reports she is here today at the encouragement of her daughter, who reports that pt. has "not been herself." Pt. reports her   from COVID related complications in 2022, and a year later her sister  of cancer. Pt. reports she was  for over forty years to her , and his death was "shocking and traumatic" for her. Pt. reports her  had underlying medical conditions, and once he contracted COVID, he was hospitalized and  days later. Pt. reports she did not speak with a mental health professional after her  , as well as her sister, and states that she "keeps her emotions inside." Pt. described her mood and attitude as "average" and acknowledged that her family dynamics are upsetting to her at this time. Pt. presently resides with her daughter, son in law and grandchildren in there home in [place]. Pt. reports she has been living here for several months, and before that was living with her son and his family. 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 Pt. denies any significant medical hx. Pt. denies SI/HI/AVH.  [FreeTextEntry2] : NA [FreeTextEntry3] : NA

## 2023-08-29 NOTE — RISK ASSESSMENT
[Clinical Interview] : Clinical Interview [(1) Less than once a week] : Frequency: How many times have you had these thoughts? Less than once a week [(1) Fleeting - few seconds/minutes] : Fleeting - a few seconds or minutes [(1) Easily able to control thoughts] : Easily able to control thoughts [(0) Does not apply] : Does not apply [No known suicide factors] : No known suicide factors [Depressed mood/Anhedonia] : depressed mood/anhedonia [History of abuse/trauma] : history of abuse/trauma [Triggering events leading to humiliation, shame, and/or despair] : triggering events leading to humiliation, shame, and/or despair (e.g. loss of relationship, financial or health status) (real or anticipated) [Inadequate social supports] : inadequate social supports [Ability to cope with stress] : ability to cope with stress [Frustration tolerance] : frustration tolerance [None in the patient's lifetime] : None in the patient's lifetime [None Known] : none known [No known risk factors] : No known risk factors [Residential stability] : residential stability [No] : no

## 2023-08-29 NOTE — PSYCHOSOCIAL ASSESSMENT
[None known] : None known [Other: _____] : [unfilled] [FreeTextEntry1] : Bachelors degree [FreeTextEntry2] : Strengths: Resilience  Supports: Daughter  Stressors: Grief and loss [FreeTextEntry3] : Adequate  [Yes (select details below)] : Have you ever experienced this type of event? Yes [had nightmares about the event(s) or thought about the event(s) when you did not want] : did not have nightmares and/or unwanted thoughts about the events [tried hard not to think about the event(s) or went out of your way to avoid situations that reminded you of the event] : tried hard to avoid thinking about events or avoid situations that reminded patient of the event [has been constantly on guard, watchful, or easily startled] : has not been constantly on guard, watchful, or easily startled [felt numb or detached from people, activities, or your surrounding] : has felt numb or detached from people, activities, or surroundings [felt guilty or unable to stop blaming yourself or others for the event(s) or any problems the event(s) may have caused] : has not felt guilty or unable to stop blaming self or others for event(s), or any problems the event(s) may have caused [Not applicable] : not applicable [Retired] : retired [Pension income] : pension income [None] : none [Private residence (home, apartment, rooming house, hotel, motel, supported housing, supported Single Room Occupancy (SRO),] : Private residence (home, apartment, rooming house, hotel, motel, supported housing, supported Single Room Occupancy (SRO), permanent housing programs, transient housing programs, and shelter plus care housing) [Client's child, stepchild, foster child, grandchild] : client's child, stepchild, foster child, grandchild [Yes] : yes [No] : Patient has personal representation (legal guardian, representative payee, conservatorship)? No

## 2023-08-29 NOTE — PLAN
[Admit to Program     (Add Program Admission information to a new column in the Admit/Discharge Flowsheet)] : Admit to program [Every ___ week(s)] : Medication Management: Every [unfilled] week(s) [Individual Therapy] : Individual Therapy

## 2023-08-29 NOTE — DISCUSSION/SUMMARY
[FreeTextEntry1] : Pt. is a 69 y/o female who presents for psychotherapy treatment. Pt. reports she is present today at the encouragement of her daughter and is ambivalent about therapy. Pt. reports she lost her  and sister in the span of a year and has been coping with grief and loss. Pt. denies SI/HI/AVH.  [Low acute suicide risk] : Low acute suicide risk [No] : No [Not clinically indicated] : Safety Plan completed/updated (for individuals at risk): Not clinically indicated

## 2023-08-29 NOTE — PHYSICAL EXAM
[Cooperative] : cooperative [Depressed] : depressed [Full] : full [Linear/Goal Directed] : linear/goal directed [None] : none [None Reported] : none reported [Average] : average [WNL] : within normal limits

## 2023-08-29 NOTE — SOCIAL HISTORY
[FreeTextEntry1] : Pt. is retired, and spends time by herself and with friends. 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

## 2023-08-29 NOTE — FAMILY HISTORY
[FreeTextEntry1] : Patients   in , and sister  in . Pt. lives with daughter, son in law and grandchildren. Pt. has two adult children.

## 2023-08-29 NOTE — REASON FOR VISIT
[Self-Referred] : Self-Referred [Not Applicable] : Not Applicable [Patient] : Patient [FreeTextEntry1] : patient reports her daughter encouraged her to speak with a therapist.

## 2023-09-19 ENCOUNTER — TRANSCRIPTION ENCOUNTER (OUTPATIENT)
Age: 68
End: 2023-09-19

## 2023-09-19 ENCOUNTER — RX RENEWAL (OUTPATIENT)
Age: 68
End: 2023-09-19

## 2023-09-20 ENCOUNTER — TRANSCRIPTION ENCOUNTER (OUTPATIENT)
Age: 68
End: 2023-09-20

## 2023-09-29 ENCOUNTER — APPOINTMENT (OUTPATIENT)
Dept: PSYCHIATRY | Facility: CLINIC | Age: 68
End: 2023-09-29

## 2023-11-17 ENCOUNTER — APPOINTMENT (OUTPATIENT)
Dept: INTERNAL MEDICINE | Facility: CLINIC | Age: 68
End: 2023-11-17
Payer: MEDICARE

## 2023-11-17 VITALS
HEIGHT: 62 IN | OXYGEN SATURATION: 97 % | WEIGHT: 193.25 LBS | BODY MASS INDEX: 35.56 KG/M2 | TEMPERATURE: 98.1 F | HEART RATE: 75 BPM | DIASTOLIC BLOOD PRESSURE: 84 MMHG | SYSTOLIC BLOOD PRESSURE: 143 MMHG

## 2023-11-17 DIAGNOSIS — K21.9 GASTRO-ESOPHAGEAL REFLUX DISEASE W/OUT ESOPHAGITIS: ICD-10-CM

## 2023-11-17 DIAGNOSIS — B37.9 CANDIDIASIS, UNSPECIFIED: ICD-10-CM

## 2023-11-17 DIAGNOSIS — Z00.00 ENCOUNTER FOR GENERAL ADULT MEDICAL EXAMINATION W/OUT ABNORMAL FINDINGS: ICD-10-CM

## 2023-11-17 DIAGNOSIS — J30.89 OTHER ALLERGIC RHINITIS: ICD-10-CM

## 2023-11-17 PROCEDURE — 99214 OFFICE O/P EST MOD 30 MIN: CPT | Mod: 25

## 2023-11-17 PROCEDURE — 36415 COLL VENOUS BLD VENIPUNCTURE: CPT

## 2023-11-17 PROCEDURE — G0439: CPT

## 2023-11-17 RX ORDER — NYSTATIN AND TRIAMCINOLONE ACETONIDE 100000; 1 MG/G; MG/G
100000-0.1 CREAM TOPICAL 4 TIMES DAILY
Qty: 1 | Refills: 3 | Status: DISCONTINUED | COMMUNITY
Start: 2023-06-30 | End: 2023-11-17

## 2023-11-17 RX ORDER — NYSTATIN 100000 [USP'U]/G
100000 CREAM TOPICAL TWICE DAILY
Qty: 1 | Refills: 0 | Status: DISCONTINUED | COMMUNITY
Start: 2023-06-30 | End: 2023-11-17

## 2023-11-17 RX ORDER — IPRATROPIUM BROMIDE 42 UG/1
0.06 SPRAY NASAL TWICE DAILY
Qty: 3 | Refills: 3 | Status: DISCONTINUED | COMMUNITY
Start: 2020-03-04 | End: 2023-11-17

## 2023-11-17 RX ORDER — FLUTICASONE PROPIONATE 50 UG/1
50 SPRAY, METERED NASAL
Qty: 3 | Refills: 1 | Status: DISCONTINUED | COMMUNITY
Start: 2020-03-04 | End: 2023-11-17

## 2023-12-11 ENCOUNTER — TRANSCRIPTION ENCOUNTER (OUTPATIENT)
Age: 68
End: 2023-12-11

## 2023-12-11 LAB
ALBUMIN SERPL ELPH-MCNC: 4.8 G/DL
ALP BLD-CCNC: 236 U/L
ALT SERPL-CCNC: 27 U/L
ANION GAP SERPL CALC-SCNC: 15 MMOL/L
APPEARANCE: ABNORMAL
AST SERPL-CCNC: 21 U/L
BACTERIA: ABNORMAL /HPF
BASOPHILS # BLD AUTO: 0.05 K/UL
BASOPHILS NFR BLD AUTO: 0.7 %
BILIRUB SERPL-MCNC: 0.4 MG/DL
BILIRUBIN URINE: NEGATIVE
BLOOD URINE: NEGATIVE
BUN SERPL-MCNC: 21 MG/DL
CALCIUM OXALATE CRYSTALS: PRESENT
CALCIUM SERPL-MCNC: 9.9 MG/DL
CAST: 35 /LPF
CHLORIDE SERPL-SCNC: 103 MMOL/L
CHOLEST SERPL-MCNC: 212 MG/DL
CO2 SERPL-SCNC: 26 MMOL/L
COLOR: NORMAL
CREAT SERPL-MCNC: 0.77 MG/DL
EGFR: 84 ML/MIN/1.73M2
EOSINOPHIL # BLD AUTO: 0.3 K/UL
EOSINOPHIL NFR BLD AUTO: 4.2 %
EPITHELIAL CELLS: 24 /HPF
ESTIMATED AVERAGE GLUCOSE: 111 MG/DL
FERRITIN SERPL-MCNC: 82 NG/ML
GLUCOSE QUALITATIVE U: NEGATIVE MG/DL
GLUCOSE SERPL-MCNC: 97 MG/DL
HBA1C MFR BLD HPLC: 5.5 %
HCT VFR BLD CALC: 42.6 %
HDLC SERPL-MCNC: 105 MG/DL
HGB BLD-MCNC: 13.6 G/DL
HYALINE CASTS: PRESENT
IMM GRANULOCYTES NFR BLD AUTO: 0.1 %
IRON SATN MFR SERPL: 33 %
IRON SERPL-MCNC: 104 UG/DL
KETONES URINE: NEGATIVE MG/DL
LDLC SERPL CALC-MCNC: 92 MG/DL
LEUKOCYTE ESTERASE URINE: ABNORMAL
LYMPHOCYTES # BLD AUTO: 1.18 K/UL
LYMPHOCYTES NFR BLD AUTO: 16.6 %
MAN DIFF?: NORMAL
MCHC RBC-ENTMCNC: 30.8 PG
MCHC RBC-ENTMCNC: 31.9 GM/DL
MCV RBC AUTO: 96.4 FL
MICROSCOPIC-UA: NORMAL
MONOCYTES # BLD AUTO: 0.45 K/UL
MONOCYTES NFR BLD AUTO: 6.3 %
MUCUS: PRESENT
NEUTROPHILS # BLD AUTO: 5.12 K/UL
NEUTROPHILS NFR BLD AUTO: 72.1 %
NITRITE URINE: NEGATIVE
NONHDLC SERPL-MCNC: 106 MG/DL
PH URINE: 5.5
PLATELET # BLD AUTO: 252 K/UL
POTASSIUM SERPL-SCNC: 4 MMOL/L
PROT SERPL-MCNC: 7.1 G/DL
PROTEIN URINE: NORMAL MG/DL
RBC # BLD: 4.42 M/UL
RBC # FLD: 12.9 %
RED BLOOD CELLS URINE: 4 /HPF
REVIEW: NORMAL
SODIUM SERPL-SCNC: 144 MMOL/L
SPECIFIC GRAVITY URINE: 1.02
TIBC SERPL-MCNC: 315 UG/DL
TRIGL SERPL-MCNC: 82 MG/DL
TSH SERPL-ACNC: 0.5 UIU/ML
UIBC SERPL-MCNC: 211 UG/DL
UROBILINOGEN URINE: 0.2 MG/DL
WBC # FLD AUTO: 7.11 K/UL
WHITE BLOOD CELLS URINE: 12 /HPF
YEAST-LIKE CELLS: PRESENT

## 2023-12-29 ENCOUNTER — APPOINTMENT (OUTPATIENT)
Dept: ULTRASOUND IMAGING | Facility: IMAGING CENTER | Age: 68
End: 2023-12-29
Payer: MEDICARE

## 2023-12-29 ENCOUNTER — OUTPATIENT (OUTPATIENT)
Dept: OUTPATIENT SERVICES | Facility: HOSPITAL | Age: 68
LOS: 1 days | End: 2023-12-29
Payer: COMMERCIAL

## 2023-12-29 ENCOUNTER — APPOINTMENT (OUTPATIENT)
Dept: MAMMOGRAPHY | Facility: IMAGING CENTER | Age: 68
End: 2023-12-29
Payer: MEDICARE

## 2023-12-29 ENCOUNTER — RESULT REVIEW (OUTPATIENT)
Age: 68
End: 2023-12-29

## 2023-12-29 DIAGNOSIS — Z00.00 ENCOUNTER FOR GENERAL ADULT MEDICAL EXAMINATION WITHOUT ABNORMAL FINDINGS: ICD-10-CM

## 2023-12-29 PROCEDURE — 76641 ULTRASOUND BREAST COMPLETE: CPT

## 2023-12-29 PROCEDURE — 77063 BREAST TOMOSYNTHESIS BI: CPT | Mod: 26

## 2023-12-29 PROCEDURE — 76641 ULTRASOUND BREAST COMPLETE: CPT | Mod: 26,50

## 2023-12-29 PROCEDURE — 77067 SCR MAMMO BI INCL CAD: CPT | Mod: 26

## 2023-12-29 PROCEDURE — 77063 BREAST TOMOSYNTHESIS BI: CPT

## 2023-12-29 PROCEDURE — 77067 SCR MAMMO BI INCL CAD: CPT

## 2024-01-16 ENCOUNTER — TRANSCRIPTION ENCOUNTER (OUTPATIENT)
Age: 69
End: 2024-01-16

## 2024-01-23 NOTE — PHYSICAL EXAM
[No Acute Distress] : no acute distress [Well Nourished] : well nourished [Well Developed] : well developed [Normal Sclera/Conjunctiva] : normal sclera/conjunctiva [Well-Appearing] : well-appearing [PERRL] : pupils equal round and reactive to light [EOMI] : extraocular movements intact [Normal Outer Ear/Nose] : the outer ears and nose were normal in appearance [Normal Oropharynx] : the oropharynx was normal [Normal TMs] : both tympanic membranes were normal [Normal Nasal Mucosa] : the nasal mucosa was normal [No JVD] : no jugular venous distention [No Lymphadenopathy] : no lymphadenopathy [Supple] : supple [Thyroid Normal, No Nodules] : the thyroid was normal and there were no nodules present [No Respiratory Distress] : no respiratory distress  [No Accessory Muscle Use] : no accessory muscle use [Clear to Auscultation] : lungs were clear to auscultation bilaterally [Normal Rate] : normal rate  [Regular Rhythm] : with a regular rhythm [Normal S1, S2] : normal S1 and S2 [No Murmur] : no murmur heard [No Carotid Bruits] : no carotid bruits [No Abdominal Bruit] : a ~M bruit was not heard ~T in the abdomen [Pedal Pulses Present] : the pedal pulses are present [No Varicosities] : no varicosities [No Edema] : there was no peripheral edema [No Palpable Aorta] : no palpable aorta [No Extremity Clubbing/Cyanosis] : no extremity clubbing/cyanosis [Soft] : abdomen soft [Non Tender] : non-tender [Non-distended] : non-distended [No Masses] : no abdominal mass palpated [No HSM] : no HSM [Normal Bowel Sounds] : normal bowel sounds [Normal Posterior Cervical Nodes] : no posterior cervical lymphadenopathy [Normal Anterior Cervical Nodes] : no anterior cervical lymphadenopathy [No CVA Tenderness] : no CVA  tenderness [No Spinal Tenderness] : no spinal tenderness [No Joint Swelling] : no joint swelling [Grossly Normal Strength/Tone] : grossly normal strength/tone [No Rash] : no rash [Coordination Grossly Intact] : coordination grossly intact [No Focal Deficits] : no focal deficits [Normal Gait] : normal gait [Speech Grossly Normal] : speech grossly normal [Deep Tendon Reflexes (DTR)] : deep tendon reflexes were 2+ and symmetric [Memory Grossly Normal] : memory grossly normal [Normal Affect] : the affect was normal [Alert and Oriented x3] : oriented to person, place, and time [Normal Mood] : the mood was normal [Normal Insight/Judgement] : insight and judgment were intact

## 2024-01-23 NOTE — HEALTH RISK ASSESSMENT
[Yes] : Yes [No] : In the past 12 months have you used drugs other than those required for medical reasons? No [No falls in past year] : Patient reported no falls in the past year [0] : 2) Feeling down, depressed, or hopeless: Not at all (0) [PHQ-2 Negative - No further assessment needed] : PHQ-2 Negative - No further assessment needed [[id number]] : 0 [Patient reported mammogram was normal] : Patient reported mammogram was normal [Patient reported bone density results were normal] : Patient reported bone density results were normal [Change in mental status noted] : No change in mental status noted [None] : None [With Family] : lives with family [Employed] : employed [] :  [Sexually Active] : not sexually active [Feels Safe at Home] : Feels safe at home [Fully functional (bathing, dressing, toileting, transferring, walking, feeding)] : Fully functional (bathing, dressing, toileting, transferring, walking, feeding) [Fully functional (using the telephone, shopping, preparing meals, housekeeping, doing laundry, using] : Fully functional and needs no help or supervision to perform IADLs (using the telephone, shopping, preparing meals, housekeeping, doing laundry, using transportation, managing medications and managing finances) [Reports changes in hearing] : Reports no changes in hearing [Reports changes in vision] : Reports no changes in vision [Reports changes in dental health] : Reports no changes in dental health [MammogramDate] : 7/2022 [BoneDensityDate] : 7/2022 [Never] : Never

## 2024-02-01 ENCOUNTER — NON-APPOINTMENT (OUTPATIENT)
Age: 69
End: 2024-02-01

## 2024-02-26 ENCOUNTER — TRANSCRIPTION ENCOUNTER (OUTPATIENT)
Age: 69
End: 2024-02-26

## 2024-03-26 ENCOUNTER — RX RENEWAL (OUTPATIENT)
Age: 69
End: 2024-03-26

## 2024-03-26 RX ORDER — OMEPRAZOLE 40 MG/1
40 CAPSULE, DELAYED RELEASE ORAL
Qty: 90 | Refills: 3 | Status: ACTIVE | COMMUNITY
Start: 2023-11-17 | End: 1900-01-01

## 2024-04-23 ENCOUNTER — NON-APPOINTMENT (OUTPATIENT)
Age: 69
End: 2024-04-23

## 2024-05-07 ENCOUNTER — RX RENEWAL (OUTPATIENT)
Age: 69
End: 2024-05-07

## 2024-05-07 RX ORDER — LEVOTHYROXINE SODIUM 0.09 MG/1
88 TABLET ORAL
Qty: 90 | Refills: 3 | Status: ACTIVE | COMMUNITY
Start: 2023-04-21 | End: 1900-01-01

## 2024-05-23 RX ORDER — VALSARTAN 160 MG/1
160 TABLET, COATED ORAL
Qty: 90 | Refills: 0 | Status: ACTIVE | COMMUNITY
Start: 2023-03-30 | End: 1900-01-01

## 2024-05-28 RX ORDER — ATORVASTATIN CALCIUM 10 MG/1
10 TABLET, FILM COATED ORAL
Qty: 90 | Refills: 0 | Status: ACTIVE | COMMUNITY
Start: 1900-01-01 | End: 1900-01-01

## 2024-06-14 ENCOUNTER — APPOINTMENT (OUTPATIENT)
Dept: INTERNAL MEDICINE | Facility: CLINIC | Age: 69
End: 2024-06-14
Payer: MEDICARE

## 2024-06-14 VITALS
HEART RATE: 75 BPM | BODY MASS INDEX: 37.17 KG/M2 | TEMPERATURE: 98.4 F | DIASTOLIC BLOOD PRESSURE: 84 MMHG | WEIGHT: 202 LBS | HEIGHT: 62 IN | OXYGEN SATURATION: 98 % | SYSTOLIC BLOOD PRESSURE: 135 MMHG

## 2024-06-14 DIAGNOSIS — R73.03 PREDIABETES.: ICD-10-CM

## 2024-06-14 DIAGNOSIS — I44.7 LEFT BUNDLE-BRANCH BLOCK, UNSPECIFIED: ICD-10-CM

## 2024-06-14 DIAGNOSIS — E03.9 HYPOTHYROIDISM, UNSPECIFIED: ICD-10-CM

## 2024-06-14 DIAGNOSIS — R79.0 ABNORMAL LVL OF BLOOD MINERAL: ICD-10-CM

## 2024-06-14 DIAGNOSIS — I10 ESSENTIAL (PRIMARY) HYPERTENSION: ICD-10-CM

## 2024-06-14 DIAGNOSIS — E66.9 OBESITY, UNSPECIFIED: ICD-10-CM

## 2024-06-14 DIAGNOSIS — E78.2 MIXED HYPERLIPIDEMIA: ICD-10-CM

## 2024-06-14 DIAGNOSIS — R74.8 ABNORMAL LEVELS OF OTHER SERUM ENZYMES: ICD-10-CM

## 2024-06-14 PROCEDURE — 99214 OFFICE O/P EST MOD 30 MIN: CPT | Mod: 25

## 2024-06-14 PROCEDURE — 36415 COLL VENOUS BLD VENIPUNCTURE: CPT

## 2024-06-14 NOTE — HISTORY OF PRESENT ILLNESS
[de-identified] : 69 y/o female patient presents today: - f/u HTN/LBBB/Obesity/hypothyroidism - BP well controlled, denies any CP/SOB/Palpitations/edema, compliant with medications, admits has not been physical active, gained weight from last visit

## 2024-06-15 LAB
ALBUMIN SERPL ELPH-MCNC: 4.2 G/DL
ALP BLD-CCNC: 191 U/L
ALT SERPL-CCNC: 27 U/L
ANION GAP SERPL CALC-SCNC: 14 MMOL/L
APPEARANCE: CLEAR
AST SERPL-CCNC: 26 U/L
BACTERIA: NEGATIVE /HPF
BILIRUB SERPL-MCNC: 0.4 MG/DL
BILIRUBIN URINE: NEGATIVE
BLOOD URINE: NEGATIVE
BUN SERPL-MCNC: 13 MG/DL
CALCIUM SERPL-MCNC: 9.6 MG/DL
CAST: 0 /LPF
CHLORIDE SERPL-SCNC: 106 MMOL/L
CHOLEST SERPL-MCNC: 181 MG/DL
CO2 SERPL-SCNC: 24 MMOL/L
COLOR: YELLOW
CREAT SERPL-MCNC: 0.74 MG/DL
CREAT SPEC-SCNC: 29 MG/DL
EGFR: 89 ML/MIN/1.73M2
EPITHELIAL CELLS: 7 /HPF
ESTIMATED AVERAGE GLUCOSE: 114 MG/DL
GLUCOSE QUALITATIVE U: NEGATIVE MG/DL
GLUCOSE SERPL-MCNC: 87 MG/DL
HBA1C MFR BLD HPLC: 5.6 %
HDLC SERPL-MCNC: 92 MG/DL
KETONES URINE: NEGATIVE MG/DL
LDLC SERPL CALC-MCNC: 79 MG/DL
LEUKOCYTE ESTERASE URINE: NEGATIVE
MICROALBUMIN 24H UR DL<=1MG/L-MCNC: <1.2 MG/DL
MICROALBUMIN/CREAT 24H UR-RTO: NORMAL MG/G
MICROSCOPIC-UA: NORMAL
NITRITE URINE: NEGATIVE
NONHDLC SERPL-MCNC: 89 MG/DL
PH URINE: 7
POTASSIUM SERPL-SCNC: 4.8 MMOL/L
PROT SERPL-MCNC: 6.7 G/DL
PROTEIN URINE: NEGATIVE MG/DL
RED BLOOD CELLS URINE: 0 /HPF
SODIUM SERPL-SCNC: 144 MMOL/L
SPECIFIC GRAVITY URINE: 1.01
TRIGL SERPL-MCNC: 55 MG/DL
TSH SERPL-ACNC: 0.64 UIU/ML
UROBILINOGEN URINE: 0.2 MG/DL
WHITE BLOOD CELLS URINE: 1 /HPF

## 2024-07-22 ENCOUNTER — TRANSCRIPTION ENCOUNTER (OUTPATIENT)
Age: 69
End: 2024-07-22

## 2024-08-07 ENCOUNTER — TRANSCRIPTION ENCOUNTER (OUTPATIENT)
Age: 69
End: 2024-08-07

## 2024-08-21 ENCOUNTER — TRANSCRIPTION ENCOUNTER (OUTPATIENT)
Age: 69
End: 2024-08-21

## 2024-08-23 ENCOUNTER — TRANSCRIPTION ENCOUNTER (OUTPATIENT)
Age: 69
End: 2024-08-23

## 2024-08-23 RX ORDER — PHENTERMINE HYDROCHLORIDE 15 MG/1
15 CAPSULE ORAL
Qty: 30 | Refills: 0 | Status: ACTIVE | COMMUNITY
Start: 2024-08-23 | End: 1900-01-01

## 2024-08-23 RX ORDER — TOPIRAMATE 25 MG/1
25 TABLET, FILM COATED ORAL
Qty: 30 | Refills: 0 | Status: ACTIVE | COMMUNITY
Start: 2024-08-23 | End: 1900-01-01

## 2024-09-09 ENCOUNTER — TRANSCRIPTION ENCOUNTER (OUTPATIENT)
Age: 69
End: 2024-09-09

## 2024-09-25 ENCOUNTER — APPOINTMENT (OUTPATIENT)
Dept: INTERNAL MEDICINE | Facility: CLINIC | Age: 69
End: 2024-09-25

## 2024-09-25 VITALS
TEMPERATURE: 98 F | HEIGHT: 62 IN | SYSTOLIC BLOOD PRESSURE: 122 MMHG | OXYGEN SATURATION: 98 % | WEIGHT: 195 LBS | BODY MASS INDEX: 35.88 KG/M2 | HEART RATE: 82 BPM | DIASTOLIC BLOOD PRESSURE: 82 MMHG

## 2024-09-25 DIAGNOSIS — I44.7 LEFT BUNDLE-BRANCH BLOCK, UNSPECIFIED: ICD-10-CM

## 2024-09-25 DIAGNOSIS — I10 ESSENTIAL (PRIMARY) HYPERTENSION: ICD-10-CM

## 2024-09-25 DIAGNOSIS — E66.9 OBESITY, UNSPECIFIED: ICD-10-CM

## 2024-09-25 PROCEDURE — 99214 OFFICE O/P EST MOD 30 MIN: CPT

## 2024-09-25 PROCEDURE — G0447 BEHAVIOR COUNSEL OBESITY 15M: CPT | Mod: 59

## 2024-09-25 NOTE — HISTORY OF PRESENT ILLNESS
[de-identified] : 68 y/o female patient presents today: - f/u Obesity/weight management - currently on Phentermine 15mg and Topamax 25mg daily, denies any side effects, recent EKG done by cardiology to confirm no cardiac SE with medication  - Total weight loss: 6 lbs

## 2024-10-02 NOTE — HISTORY OF PRESENT ILLNESS
Patient requests all Lab, Cardiology, and Radiology Results on their Discharge Instructions [de-identified] : 69 y/o female patient presents today for AWV: - reviewed age appropriate preventive screening exams - reviewed and updated PMH/Medications/Allergies/Surgical hx - f/u HTN/LBBB/Obesity/Pre-diabetes /hypothyroidism - BP well controlled, denies any CP/SOB/Palpitations/edema, compliant with medications

## 2024-10-25 ENCOUNTER — APPOINTMENT (OUTPATIENT)
Dept: INTERNAL MEDICINE | Facility: CLINIC | Age: 69
End: 2024-10-25
Payer: MEDICARE

## 2024-10-25 VITALS
HEART RATE: 85 BPM | BODY MASS INDEX: 35.25 KG/M2 | HEIGHT: 62 IN | WEIGHT: 191.56 LBS | TEMPERATURE: 98 F | SYSTOLIC BLOOD PRESSURE: 124 MMHG | DIASTOLIC BLOOD PRESSURE: 85 MMHG | OXYGEN SATURATION: 94 %

## 2024-10-25 DIAGNOSIS — I10 ESSENTIAL (PRIMARY) HYPERTENSION: ICD-10-CM

## 2024-10-25 DIAGNOSIS — Z76.89 PERSONS ENCOUNTERING HEALTH SERVICES IN OTHER SPECIFIED CIRCUMSTANCES: ICD-10-CM

## 2024-10-25 DIAGNOSIS — E66.9 OBESITY, UNSPECIFIED: ICD-10-CM

## 2024-10-25 PROCEDURE — G0447 BEHAVIOR COUNSEL OBESITY 15M: CPT | Mod: 59

## 2024-10-25 PROCEDURE — 99214 OFFICE O/P EST MOD 30 MIN: CPT

## 2024-10-28 ENCOUNTER — TRANSCRIPTION ENCOUNTER (OUTPATIENT)
Age: 69
End: 2024-10-28

## 2024-11-18 ENCOUNTER — APPOINTMENT (OUTPATIENT)
Dept: INTERNAL MEDICINE | Facility: CLINIC | Age: 69
End: 2024-11-18

## 2024-11-18 VITALS
DIASTOLIC BLOOD PRESSURE: 82 MMHG | BODY MASS INDEX: 34.41 KG/M2 | SYSTOLIC BLOOD PRESSURE: 123 MMHG | WEIGHT: 187 LBS | RESPIRATION RATE: 16 BRPM | OXYGEN SATURATION: 95 % | TEMPERATURE: 98.1 F | HEART RATE: 99 BPM | HEIGHT: 62 IN

## 2024-11-18 DIAGNOSIS — Z00.00 ENCOUNTER FOR GENERAL ADULT MEDICAL EXAMINATION W/OUT ABNORMAL FINDINGS: ICD-10-CM

## 2024-11-18 DIAGNOSIS — E03.9 HYPOTHYROIDISM, UNSPECIFIED: ICD-10-CM

## 2024-11-18 DIAGNOSIS — I10 ESSENTIAL (PRIMARY) HYPERTENSION: ICD-10-CM

## 2024-11-18 DIAGNOSIS — E53.8 DEFICIENCY OF OTHER SPECIFIED B GROUP VITAMINS: ICD-10-CM

## 2024-11-18 DIAGNOSIS — R74.8 ABNORMAL LEVELS OF OTHER SERUM ENZYMES: ICD-10-CM

## 2024-11-18 DIAGNOSIS — E78.2 MIXED HYPERLIPIDEMIA: ICD-10-CM

## 2024-11-18 DIAGNOSIS — I44.7 LEFT BUNDLE-BRANCH BLOCK, UNSPECIFIED: ICD-10-CM

## 2024-11-18 DIAGNOSIS — E66.9 OBESITY, UNSPECIFIED: ICD-10-CM

## 2024-11-18 PROCEDURE — G0439: CPT

## 2024-11-18 PROCEDURE — 36415 COLL VENOUS BLD VENIPUNCTURE: CPT

## 2024-11-18 PROCEDURE — G0447 BEHAVIOR COUNSEL OBESITY 15M: CPT | Mod: 59

## 2024-11-18 PROCEDURE — 99214 OFFICE O/P EST MOD 30 MIN: CPT | Mod: 25

## 2024-11-20 RX ORDER — PHENTERMINE HYDROCHLORIDE 15 MG/1
15 CAPSULE ORAL
Qty: 30 | Refills: 0 | Status: DISCONTINUED | COMMUNITY
Start: 2024-09-25